# Patient Record
Sex: FEMALE | Race: WHITE | NOT HISPANIC OR LATINO | Employment: FULL TIME | URBAN - METROPOLITAN AREA
[De-identification: names, ages, dates, MRNs, and addresses within clinical notes are randomized per-mention and may not be internally consistent; named-entity substitution may affect disease eponyms.]

---

## 2018-08-15 ENCOUNTER — OFFICE VISIT (OUTPATIENT)
Dept: ORTHOPEDICS | Facility: CLINIC | Age: 38
End: 2018-08-15
Payer: COMMERCIAL

## 2018-08-15 VITALS — HEIGHT: 62 IN | WEIGHT: 181 LBS | BODY MASS INDEX: 33.31 KG/M2

## 2018-08-15 DIAGNOSIS — M17.11 PRIMARY OSTEOARTHRITIS OF RIGHT KNEE: Primary | ICD-10-CM

## 2018-08-15 PROCEDURE — 99214 OFFICE O/P EST MOD 30 MIN: CPT | Mod: 25,,, | Performed by: ORTHOPAEDIC SURGERY

## 2018-08-15 PROCEDURE — 3008F BODY MASS INDEX DOCD: CPT | Mod: ,,, | Performed by: ORTHOPAEDIC SURGERY

## 2018-08-15 RX ADMIN — TRIAMCINOLONE ACETONIDE 40 MG: 40 INJECTION, SUSPENSION INTRA-ARTICULAR; INTRAMUSCULAR at 06:08

## 2018-08-15 NOTE — PROGRESS NOTES
Subjective:       Chief Complaint    Chief Complaint   Patient presents with    Right Knee - Pain, Swelling    Knee Pain     Pt presents c/o pain to R knee. Reports that she has been experiencing pain x 1 year. She was previously unemployed for several years and when returning to work this year, the symptoms began. Denies injury/trauma. Pain increases w/ flexion/extension. pain radiates along anterior portion of limb. Rates pain as 9/10. Had xrays performed at [facility]. States that she has noticed fluid accumulation and now is entirely unable to bend without excruciating pain. utilizes NSAIDS w/ minimal relief. Has had no      Knee Pain     previous medical care for condition. No additional complaints at this time.       JAZMYN Presley is a 37 y.o.  female who presents With a history of progressive pain in her right knee for the past year.      Past History  Past Medical History:   Diagnosis Date    Anxiety      Past Surgical History:   Procedure Laterality Date    TONSILLECTOMY      TUBAL LIGATION       Social History     Socioeconomic History    Marital status:      Spouse name: Not on file    Number of children: Not on file    Years of education: Not on file    Highest education level: Not on file   Social Needs    Financial resource strain: Not on file    Food insecurity - worry: Not on file    Food insecurity - inability: Not on file    Transportation needs - medical: Not on file    Transportation needs - non-medical: Not on file   Occupational History    Not on file   Tobacco Use    Smoking status: Former Smoker     Types: Cigarettes     Last attempt to quit: 2016     Years since quittin.6    Smokeless tobacco: Never Used   Substance and Sexual Activity    Alcohol use: Yes     Comment: rarely    Drug use: Not on file    Sexual activity: Not on file   Other Topics Concern    Not on file   Social History Narrative    Not on file         Medications  Current Outpatient Medications    Medication Sig    alprazolam (XANAX) 1 MG tablet Take 1 mg by mouth 2 (two) times daily.    hydrocodone-acetaminophen  (LORTAB)  mg per tablet Take 1 tablet by mouth every 6 (six) hours as needed.    ibuprofen (ADVIL,MOTRIN) 200 MG tablet Take 200 mg by mouth every 6 (six) hours as needed.     No current facility-administered medications for this visit.        Allergies  Review of patient's allergies indicates:  No Known Allergies      Review of Systems     Constitutional: Negative    HENT: Negative  Eyes: Negative  Respiratory: Negative  Cardiovascular: Negative  Musculoskeletal: HPI  Skin: Negative  Neurological: Negative  Hematological: Negative  Endocrine: Negative      Physical Exam    There were no vitals filed for this visit.  Physical Examination:Well-developed, well-nourished white female, moderately overweight. Right knee examination revealed a moderately large effusion with the motion 0-105°. Tenderness. Crepitance was present. The knee was stable.     Skin-clear  General appearance -  well appearing, and in no distress  Mental status - awake  Neck - supple  Chest -  symmetric air entry  Heart - normal rate   Abdomen - soft      Assessment/Plan   Primary osteoarthritis of right knee    Under sterile conditions. An arthrocentesis was performed with 15 cc of clear yellow fluid removed. Knee was injected with 40 mg Kenalog and lidocaine.    Instructed in 3 position. Leg raises and use of heat and ice. Encouraged to lose weight.    This note was dictated using voice recognition software and may contain grammatical errors.

## 2018-09-03 PROCEDURE — 20610 DRAIN/INJ JOINT/BURSA W/O US: CPT | Mod: ,,, | Performed by: ORTHOPAEDIC SURGERY

## 2018-09-03 RX ORDER — TRIAMCINOLONE ACETONIDE 40 MG/ML
40 INJECTION, SUSPENSION INTRA-ARTICULAR; INTRAMUSCULAR
Status: DISCONTINUED | OUTPATIENT
Start: 2018-08-15 | End: 2018-08-15 | Stop reason: HOSPADM

## 2018-09-03 NOTE — PROCEDURES
Large Joint Aspiration/Injection: R knee  Date/Time: 8/15/2018 6:05 PM  Performed by: Goran Lerma Jr., MD  Authorized by: Goran Lerma Jr., MD     Consent Done?:  Yes (Verbal)  Indications:  Pain and joint swelling  Procedure site marked: Yes    Timeout: Prior to procedure the correct patient, procedure, and site was verified      Location:  Knee  Site:  R knee (joint)  Prep: Patient was prepped and draped in usual sterile fashion    Ultrasonic Guidance for needle placement: No  Needle size:  22 G  Approach:  Lateral  Medications:  40 mg triamcinolone acetonide 40 mg/mL  Aspirate amount (ml):  15  Aspirate:  Clear and yellow  Patient tolerance:  Patient tolerated the procedure well with no immediate complications

## 2018-10-16 ENCOUNTER — OFFICE VISIT (OUTPATIENT)
Dept: ORTHOPEDICS | Facility: CLINIC | Age: 38
End: 2018-10-16
Payer: COMMERCIAL

## 2018-10-16 VITALS — WEIGHT: 181 LBS | HEIGHT: 62 IN | BODY MASS INDEX: 33.31 KG/M2

## 2018-10-16 DIAGNOSIS — M17.11 PRIMARY OSTEOARTHRITIS OF RIGHT KNEE: Primary | ICD-10-CM

## 2018-10-16 PROCEDURE — 99214 OFFICE O/P EST MOD 30 MIN: CPT | Mod: 25,,, | Performed by: ORTHOPAEDIC SURGERY

## 2018-10-16 PROCEDURE — 20610 DRAIN/INJ JOINT/BURSA W/O US: CPT | Mod: RT,,, | Performed by: ORTHOPAEDIC SURGERY

## 2018-10-16 RX ORDER — CETIRIZINE HYDROCHLORIDE 10 MG/1
10 TABLET ORAL DAILY PRN
COMMUNITY
End: 2019-03-13 | Stop reason: SDUPTHER

## 2018-10-16 RX ORDER — TRIAMCINOLONE ACETONIDE 40 MG/ML
40 INJECTION, SUSPENSION INTRA-ARTICULAR; INTRAMUSCULAR
Status: DISCONTINUED | OUTPATIENT
Start: 2018-10-16 | End: 2018-10-16 | Stop reason: HOSPADM

## 2018-10-16 RX ADMIN — TRIAMCINOLONE ACETONIDE 40 MG: 40 INJECTION, SUSPENSION INTRA-ARTICULAR; INTRAMUSCULAR at 04:10

## 2018-10-16 NOTE — PROGRESS NOTES
Subjective:       Chief Complaint    Chief Complaint   Patient presents with    Follow-up     right knee pain since 2017 w/ no known injury.  Knee was aspirated on 8/15/18 which helped.  Twisted her knee approx. 1 week ago which caused pain & swelling again.  Requesting her knee be drained again if possible.       JAZMYN Presley is a 38 y.o.  female who presents with recurrent pain and swelling in the right knee. Uses ibuprofen with partial relief.      Past History  Past Medical History:   Diagnosis Date    Anxiety      Past Surgical History:   Procedure Laterality Date    TONSILLECTOMY      TUBAL LIGATION       Social History     Socioeconomic History    Marital status:      Spouse name: Not on file    Number of children: Not on file    Years of education: Not on file    Highest education level: Not on file   Social Needs    Financial resource strain: Not on file    Food insecurity - worry: Not on file    Food insecurity - inability: Not on file    Transportation needs - medical: Not on file    Transportation needs - non-medical: Not on file   Occupational History    Not on file   Tobacco Use    Smoking status: Former Smoker     Types: Cigarettes     Last attempt to quit: 2016     Years since quittin.7    Smokeless tobacco: Never Used   Substance and Sexual Activity    Alcohol use: Yes     Comment: rarely    Drug use: Not on file    Sexual activity: Not on file   Other Topics Concern    Not on file   Social History Narrative    Not on file         Medications  Current Outpatient Medications   Medication Sig    cetirizine (ZYRTEC) 10 MG tablet Take 10 mg by mouth daily as needed for Allergies.    ibuprofen (ADVIL,MOTRIN) 200 MG tablet Take 200 mg by mouth every 6 (six) hours as needed.     No current facility-administered medications for this visit.        Allergies  Review of patient's allergies indicates:  No Known Allergies      Review of Systems     Constitutional:  Negative    HENT: Negative  Eyes: Negative  Respiratory: Negative  Cardiovascular: Negative  Musculoskeletal: HPI  Skin: Negative  Neurological: Negative  Hematological: Negative  Endocrine: Negative      Physical Exam    There were no vitals filed for this visit.  Physical Examination: Right knee examination reveals moderate effusion with diffuse joint line tenderness. Patellofemoral crepitance. Varus alignment. Diffuse joint line tenderness. Knee is stable. Last is intact.     Skin-skin clear. Knee is stable. Neurovascular intact.  General appearance -  well appearing, and in no distress  Mental status - awake  Neck - supple  Chest -  symmetric air entry  Heart - normal rate   Abdomen - soft      Assessment/Plan   Primary osteoarthritis of right knee  -     Large Joint Aspiration/Injection: R knee    Weight loss and exercise is discussed again. She works 40 hours a week. Says she is too tired to exercise when she comes home. X-rays taken in August. At [facility] emergency room were reviewed. Again, nonweightbearing films showed a cartilage space is well-maintained. Degenerative changes are evident at the patellofemoral joint as well as at the joint margins with early osteophytic lipping.        This note was dictated using voice recognition software and may contain grammatical errors.

## 2018-10-16 NOTE — PROCEDURES
Large Joint Aspiration/Injection: R knee  Date/Time: 10/16/2018 4:15 PM  Performed by: Goran Lerma Jr., MD  Authorized by: Goran Lerma Jr., MD     Consent Done?:  Yes (Verbal)  Indications:  Pain and joint swelling  Procedure site marked: Yes    Timeout: Prior to procedure the correct patient, procedure, and site was verified      Location:  Knee  Site:  R knee (Joint)  Prep: Patient was prepped and draped in usual sterile fashion    Ultrasonic Guidance for needle placement: No  Needle size:  22 G and 25 G  Approach:  Lateral  Medications:  40 mg triamcinolone acetonide 40 mg/mL  Patient tolerance:  Patient tolerated the procedure well with no immediate complications

## 2019-03-13 ENCOUNTER — CLINICAL SUPPORT (OUTPATIENT)
Dept: URGENT CARE | Facility: CLINIC | Age: 39
End: 2019-03-13
Payer: COMMERCIAL

## 2019-03-13 VITALS
RESPIRATION RATE: 15 BRPM | OXYGEN SATURATION: 98 % | WEIGHT: 205 LBS | BODY MASS INDEX: 36.32 KG/M2 | HEIGHT: 63 IN | SYSTOLIC BLOOD PRESSURE: 132 MMHG | DIASTOLIC BLOOD PRESSURE: 85 MMHG | HEART RATE: 91 BPM | TEMPERATURE: 99 F

## 2019-03-13 DIAGNOSIS — Z20.828 EXPOSURE TO INFLUENZA: ICD-10-CM

## 2019-03-13 DIAGNOSIS — J02.9 PHARYNGITIS, UNSPECIFIED ETIOLOGY: Primary | ICD-10-CM

## 2019-03-13 DIAGNOSIS — J00 ACUTE NASOPHARYNGITIS: ICD-10-CM

## 2019-03-13 LAB
CTP QC/QA: YES
FLUAV AG NPH QL: NEGATIVE
FLUBV AG NPH QL: NEGATIVE

## 2019-03-13 PROCEDURE — 99204 PR OFFICE/OUTPT VISIT, NEW, LEVL IV, 45-59 MIN: ICD-10-PCS | Mod: 25,S$GLB,, | Performed by: NURSE PRACTITIONER

## 2019-03-13 PROCEDURE — 99204 OFFICE O/P NEW MOD 45 MIN: CPT | Mod: 25,S$GLB,, | Performed by: NURSE PRACTITIONER

## 2019-03-13 PROCEDURE — 96372 THER/PROPH/DIAG INJ SC/IM: CPT | Mod: S$GLB,,, | Performed by: NURSE PRACTITIONER

## 2019-03-13 PROCEDURE — 87804 POCT INFLUENZA A/B: ICD-10-PCS | Mod: 59,QW,, | Performed by: NURSE PRACTITIONER

## 2019-03-13 PROCEDURE — 96372 PR INJECTION,THERAP/PROPH/DIAG2ST, IM OR SUBCUT: ICD-10-PCS | Mod: S$GLB,,, | Performed by: NURSE PRACTITIONER

## 2019-03-13 PROCEDURE — 87804 INFLUENZA ASSAY W/OPTIC: CPT | Mod: QW,,, | Performed by: NURSE PRACTITIONER

## 2019-03-13 RX ORDER — OSELTAMIVIR PHOSPHATE 75 MG/1
75 CAPSULE ORAL 2 TIMES DAILY
Qty: 10 CAPSULE | Refills: 0 | Status: SHIPPED | OUTPATIENT
Start: 2019-03-13 | End: 2019-03-18

## 2019-03-13 RX ORDER — DEXAMETHASONE SODIUM PHOSPHATE 4 MG/ML
8 INJECTION, SOLUTION INTRA-ARTICULAR; INTRALESIONAL; INTRAMUSCULAR; INTRAVENOUS; SOFT TISSUE
Status: COMPLETED | OUTPATIENT
Start: 2019-03-13 | End: 2019-03-13

## 2019-03-13 RX ORDER — CETIRIZINE HYDROCHLORIDE 10 MG/1
10 TABLET ORAL DAILY PRN
Qty: 30 TABLET | Refills: 0 | Status: SHIPPED | OUTPATIENT
Start: 2019-03-13 | End: 2019-03-27 | Stop reason: SDUPTHER

## 2019-03-13 RX ORDER — FLUTICASONE PROPIONATE 50 MCG
2 SPRAY, SUSPENSION (ML) NASAL DAILY
Qty: 15.8 ML | Refills: 0 | Status: SHIPPED | OUTPATIENT
Start: 2019-03-13 | End: 2022-09-12

## 2019-03-13 RX ADMIN — DEXAMETHASONE SODIUM PHOSPHATE 8 MG: 4 INJECTION, SOLUTION INTRA-ARTICULAR; INTRALESIONAL; INTRAMUSCULAR; INTRAVENOUS; SOFT TISSUE at 04:03

## 2019-03-13 NOTE — PATIENT INSTRUCTIONS
Viral Upper Respiratory Illness (Adult)  You have a viral upper respiratory illness (URI), which is another term for the common cold. This illness is contagious during the first few days. It is spread through the air by coughing and sneezing. It may also be spread by direct contact (touching the sick person and then touching your own eyes, nose, or mouth). Frequent handwashing will decrease risk of spread. Most viral illnesses go away within 7 to 10 days with rest and simple home remedies. Sometimes the illness may last for several weeks. Antibiotics will not kill a virus, and they are generally not prescribed for this condition.    Home care  · If symptoms are severe, rest at home for the first 2 to 3 days. When you resume activity, don't let yourself get too tired.  · Avoid being exposed to cigarette smoke (yours or others).  · You may use acetaminophen or ibuprofen to control pain and fever, unless another medicine was prescribed. (Note: If you have chronic liver or kidney disease, have ever had a stomach ulcer or gastrointestinal bleeding, or are taking blood-thinning medicines, talk with your healthcare provider before using these medicines.) Aspirin should never be given to anyone under 18 years of age who is ill with a viral infection or fever. It may cause severe liver or brain damage.  · Your appetite may be poor, so a light diet is fine. Avoid dehydration by drinking 6 to 8 glasses of fluids per day (water, soft drinks, juices, tea, or soup). Extra fluids will help loosen secretions in the nose and lungs.  · Over-the-counter cold medicines will not shorten the length of time youre sick, but they may be helpful for the following symptoms: cough, sore throat, and nasal and sinus congestion. (Note: Do not use decongestants if you have high blood pressure.)  Follow-up care  Follow up with your healthcare provider, or as advised.  When to seek medical advice  Call your healthcare provider right away if any  of these occur:  · Cough with lots of colored sputum (mucus)  · Severe headache; face, neck, or ear pain  · Difficulty swallowing due to throat pain  · Fever of 100.4°F (38°C)  Call 911, or get immediate medical care  Call emergency services right away if any of these occur:  · Chest pain, shortness of breath, wheezing, or difficulty breathing  · Coughing up blood  · Inability to swallow due to throat pain  Date Last Reviewed: 9/13/2015  © 4396-1765 [employer]. [address] [place] [address]. All rights reserved. This information is not intended as a substitute for professional medical care. Always follow your healthcare professional's instructions.        Adult Self-Care for Colds  Colds are caused by viruses. They can't be cured with antibiotics. However, you can ease symptoms and support your body's efforts to heal itself.  No matter which symptoms you have, be sure to:  · Drink plenty of fluids (water or clear soup)  · Stop smoking and drinking alcohol  · Get plenty of rest    Understand a fever  · Take your temperature several times a day. If your fever is 100.4°F (38.0°C) for more than a day, call your healthcare provider.  · Relax, lie down. Go to bed if you want. Just get off your feet and rest. Also, drink plenty of fluids to avoid dehydration.  · Take acetaminophen or a nonsteroidal anti-inflammatory agent (NSAID), such as ibuprofen.  Treat a troubled nose kindly  · Breathe steam or heated humidified air to open blocked nasal passages.  a hot shower or use a vaporizer. Be careful not to get burned by the steam.  · Saline nasal sprays and decongestant tablets help open a stuffy nose. Antihistamines can also help, but they can cause side effects such as drowsiness and drying of the eyes, nose, and mouth.  Soothe a sore throat and cough  · Gargle every 2 hours with 1/4 teaspoon of salt dissolved in 1/2 cup of warm water. Suck on throat lozenges and cough drops to moisten your  throat.  · Cough medicines are available but it is unclear how well they actually work.  · Take acetaminophen or an NSAID, such as ibuprofen, to ease throat pain  Ease digestive problems  · Put fluids back into your body. Take frequent sips of clear liquids such as water or broth. Avoid drinks that have a lot of sugar in them, such as juices and sodas. These can make diarrhea worse. Older children and adults can drink sports drinks.  · As your appetite returns, you can resume your normal diet. Ask your healthcare provider if there are any foods you should avoid.  When to seek medical care  When you first notice symptoms, ask your healthcare provider if antiviral medicines are appropriate. Antibiotics should not be taken for colds or flu. Also, call your healthcare provider if you have any of the following symptoms or if you aren't feeling better after 7 days:  · Shortness of breath  · Pain or pressure in the chest or belly (abdomen)  · Worsening symptoms, especially after a period of improvement  · Fever of 100.4°F  (38.0°C) or higher, or fever that doesn't go down with medicine  · Sudden dizziness or confusion  · Severe or continued vomiting  · Signs of dehydration, including extreme thirst, dark urine, infrequent urination, dry mouth  · Spotted, red, or very sore throat   Date Last Reviewed: 12/1/2016  © 6098-0924 [employer]. [address] [place] [address], [place] [address]. All rights reserved. This information is not intended as a substitute for professional medical care. Always follow your healthcare professional's instructions.

## 2019-03-13 NOTE — PROGRESS NOTES
"Subjective:       Patient ID: Susana Presley is a 38 y.o. female.    Vitals:  height is 5' 3" (1.6 m) and weight is 93 kg (205 lb). Her temperature is 98.5 °F (36.9 °C). Her blood pressure is 132/85 and her pulse is 91. Her respiration is 15 and oxygen saturation is 98%.     Chief Complaint: Sinus Problem    Patient complains of body aches, sore throat, sinus congestion, pressure in ears, and sneezing that began yesterday. Patient states she was with her mom all day on Sunday "and she was coughing on everyone", states her mom was diagnosed with the flu on Monday. Denies fever, sob, chest pain, nausea, vomiting, diarrhea.       Sinus Problem   The current episode started in the past 7 days. There has been no fever. Associated symptoms include chills, congestion, coughing, ear pain, sinus pressure and sneezing. Pertinent negatives include no headaches, shortness of breath or sore throat. (Diarrhea,dizzy ) Treatments tried: afrin  The treatment provided no relief.       Constitution: Positive for chills. Negative for fatigue and fever.   HENT: Positive for ear pain, congestion, postnasal drip, sinus pain and sinus pressure. Negative for sore throat.    Neck: Negative for painful lymph nodes.   Cardiovascular: Negative for chest pain and leg swelling.   Eyes: Negative for double vision and blurred vision.   Respiratory: Positive for cough. Negative for shortness of breath.    Gastrointestinal: Negative for abdominal pain, nausea, vomiting and diarrhea.   Endocrine: negative.   Genitourinary: Negative for dysuria, frequency, urgency and history of kidney stones.   Musculoskeletal: Negative for joint pain, joint swelling, muscle cramps and muscle ache.   Skin: Negative for color change, pale, rash and bruising.   Allergic/Immunologic: Positive for sneezing. Negative for seasonal allergies.   Neurological: Negative for dizziness, history of vertigo, light-headedness, passing out and headaches.   Hematologic/Lymphatic: " Negative for swollen lymph nodes.   Psychiatric/Behavioral: Negative for nervous/anxious, sleep disturbance and depression. The patient is not nervous/anxious.        Objective:      Physical Exam   Constitutional: She is oriented to person, place, and time. Vital signs are normal. She appears well-developed and well-nourished. She is cooperative.  Non-toxic appearance. She does not have a sickly appearance. She does not appear ill. No distress.   HENT:   Head: Normocephalic and atraumatic.   Right Ear: Hearing, tympanic membrane, external ear and ear canal normal.   Left Ear: Hearing, tympanic membrane, external ear and ear canal normal.   Nose: Mucosal edema and rhinorrhea present. No nasal deformity. No epistaxis. Right sinus exhibits maxillary sinus tenderness and frontal sinus tenderness. Left sinus exhibits maxillary sinus tenderness and frontal sinus tenderness.   Mouth/Throat: Uvula is midline and mucous membranes are normal. No trismus in the jaw. Normal dentition. No uvula swelling. Posterior oropharyngeal erythema present. No oropharyngeal exudate or posterior oropharyngeal edema.   Eyes: Conjunctivae and lids are normal. Right eye exhibits no discharge. Left eye exhibits no discharge. No scleral icterus.   Neck: Trachea normal, normal range of motion, full passive range of motion without pain and phonation normal. Neck supple.   Cardiovascular: Normal rate, regular rhythm, normal heart sounds, intact distal pulses and normal pulses.   Pulmonary/Chest: Effort normal and breath sounds normal. No respiratory distress.   Abdominal: Soft. Normal appearance and bowel sounds are normal. She exhibits no distension, no pulsatile midline mass and no mass. There is no tenderness.   Musculoskeletal: Normal range of motion. She exhibits no edema or deformity.   Lymphadenopathy:     She has no cervical adenopathy.   Neurological: She is alert and oriented to person, place, and time. She exhibits normal muscle tone.  Coordination normal. GCS eye subscore is 4. GCS verbal subscore is 5. GCS motor subscore is 6.   Skin: Skin is warm, dry and intact. No rash noted. She is not diaphoretic. No pallor.   Psychiatric: She has a normal mood and affect. Her speech is normal and behavior is normal. Judgment and thought content normal. Cognition and memory are normal.   Nursing note and vitals reviewed.      Assessment:       1. Pharyngitis, unspecified etiology    2. Exposure to influenza    3. Acute nasopharyngitis        Plan:         Influenza negative.     Will treat as influenza based on history, physical exam, and prevalence of influenza within the community. Advised to increase fluids, rest and take tylenol or motrin for fever. Follow up with PCP.     After complete evaluation, including thorough history and physical exam, the patient's symptoms are most likely due to viral upper respiratory infection. There are no concerning features on physical exam to suggest bacterial otitis media/externa, sinusitis, pharyngitis, or peritonsillar abscess. Vital signs do not suggest sepsis. Lung sounds are clear and not consistent with pneumonia. There is no neck pain or limited ROM to suggest retropharyngeal abscess or meningitis. The patient will be treated with supportive care. Will provide RX for zyrtec and flonase upon D/C.    Pharyngitis, unspecified etiology  -     POCT Influenza A/B    Exposure to influenza    Acute nasopharyngitis    Other orders  -     dexamethasone injection 8 mg  -     fluticasone (FLONASE) 50 mcg/actuation nasal spray; 2 sprays (100 mcg total) by Each Nare route once daily.  Dispense: 15.8 mL; Refill: 0  -     dexchlorphen-phenylephrine-DM (POLYTUSSIN DM) 1-5-10 mg/5 mL Syrp; Take 5 mLs by mouth every 4 (four) hours as needed.  Dispense: 120 mL; Refill: 0  -     oseltamivir (TAMIFLU) 75 MG capsule; Take 1 capsule (75 mg total) by mouth 2 (two) times daily. for 5 days  Dispense: 10 capsule; Refill: 0  -      cetirizine (ZYRTEC) 10 MG tablet; Take 1 tablet (10 mg total) by mouth daily as needed for Allergies.  Dispense: 30 tablet; Refill: 0

## 2019-03-27 ENCOUNTER — CLINICAL SUPPORT (OUTPATIENT)
Dept: URGENT CARE | Facility: CLINIC | Age: 39
End: 2019-03-27
Payer: COMMERCIAL

## 2019-03-27 VITALS
OXYGEN SATURATION: 97 % | HEIGHT: 64 IN | HEART RATE: 91 BPM | BODY MASS INDEX: 35.37 KG/M2 | DIASTOLIC BLOOD PRESSURE: 88 MMHG | SYSTOLIC BLOOD PRESSURE: 127 MMHG | TEMPERATURE: 99 F | WEIGHT: 207.19 LBS | RESPIRATION RATE: 14 BRPM

## 2019-03-27 DIAGNOSIS — J01.90 ACUTE NON-RECURRENT SINUSITIS, UNSPECIFIED LOCATION: Primary | ICD-10-CM

## 2019-03-27 PROCEDURE — 99214 OFFICE O/P EST MOD 30 MIN: CPT | Mod: 25,S$GLB,, | Performed by: NURSE PRACTITIONER

## 2019-03-27 PROCEDURE — 99214 PR OFFICE/OUTPT VISIT, EST, LEVL IV, 30-39 MIN: ICD-10-PCS | Mod: 25,S$GLB,, | Performed by: NURSE PRACTITIONER

## 2019-03-27 PROCEDURE — 96372 PR INJECTION,THERAP/PROPH/DIAG2ST, IM OR SUBCUT: ICD-10-PCS | Mod: S$GLB,,, | Performed by: NURSE PRACTITIONER

## 2019-03-27 PROCEDURE — 96372 THER/PROPH/DIAG INJ SC/IM: CPT | Mod: S$GLB,,, | Performed by: NURSE PRACTITIONER

## 2019-03-27 RX ORDER — BENZONATATE 100 MG/1
100 CAPSULE ORAL 3 TIMES DAILY PRN
Qty: 30 CAPSULE | Refills: 1 | Status: SHIPPED | OUTPATIENT
Start: 2019-03-27 | End: 2019-03-27 | Stop reason: SDUPTHER

## 2019-03-27 RX ORDER — FLUTICASONE PROPIONATE 50 MCG
2 SPRAY, SUSPENSION (ML) NASAL 2 TIMES DAILY
Qty: 1 BOTTLE | Refills: 0 | Status: SHIPPED | OUTPATIENT
Start: 2019-03-27 | End: 2022-09-12

## 2019-03-27 RX ORDER — CETIRIZINE HYDROCHLORIDE 10 MG/1
10 TABLET ORAL DAILY PRN
Qty: 30 TABLET | Refills: 0 | Status: SHIPPED | OUTPATIENT
Start: 2019-03-27 | End: 2022-09-12

## 2019-03-27 RX ORDER — BROMPHENIRAMINE MALEATE, PSEUDOEPHEDRINE HYDROCHLORIDE, AND DEXTROMETHORPHAN HYDROBROMIDE 2; 30; 10 MG/5ML; MG/5ML; MG/5ML
5 SYRUP ORAL EVERY 4 HOURS PRN
Qty: 240 ML | Refills: 0 | Status: SHIPPED | OUTPATIENT
Start: 2019-03-27 | End: 2019-04-06

## 2019-03-27 RX ORDER — PREDNISONE 20 MG/1
20 TABLET ORAL 2 TIMES DAILY
Qty: 10 TABLET | Refills: 0 | Status: SHIPPED | OUTPATIENT
Start: 2019-03-27 | End: 2019-04-01

## 2019-03-27 RX ORDER — BENZONATATE 100 MG/1
100 CAPSULE ORAL 3 TIMES DAILY PRN
Qty: 30 CAPSULE | Refills: 1 | Status: SHIPPED | OUTPATIENT
Start: 2019-03-27 | End: 2020-03-26

## 2019-03-27 RX ORDER — PREDNISONE 20 MG/1
20 TABLET ORAL 2 TIMES DAILY
Qty: 10 TABLET | Refills: 0 | Status: SHIPPED | OUTPATIENT
Start: 2019-03-27 | End: 2019-03-27 | Stop reason: SDUPTHER

## 2019-03-27 RX ORDER — FLUTICASONE PROPIONATE 50 MCG
2 SPRAY, SUSPENSION (ML) NASAL 2 TIMES DAILY
Qty: 1 BOTTLE | Refills: 0 | Status: SHIPPED | OUTPATIENT
Start: 2019-03-27 | End: 2019-03-27 | Stop reason: SDUPTHER

## 2019-03-27 RX ORDER — DEXAMETHASONE SODIUM PHOSPHATE 4 MG/ML
8 INJECTION, SOLUTION INTRA-ARTICULAR; INTRALESIONAL; INTRAMUSCULAR; INTRAVENOUS; SOFT TISSUE
Status: COMPLETED | OUTPATIENT
Start: 2019-03-27 | End: 2019-03-27

## 2019-03-27 RX ADMIN — DEXAMETHASONE SODIUM PHOSPHATE 8 MG: 4 INJECTION, SOLUTION INTRA-ARTICULAR; INTRALESIONAL; INTRAMUSCULAR; INTRAVENOUS; SOFT TISSUE at 02:03

## 2019-03-27 NOTE — PROGRESS NOTES
"Subjective:       Patient ID: Susana Presley is a 38 y.o. female.    Vitals:  height is 5' 4" (1.626 m) and weight is 94 kg (207 lb 3.2 oz). Her oral temperature is 98.5 °F (36.9 °C). Her blood pressure is 127/88 and her pulse is 91. Her respiration is 14 and oxygen saturation is 97%.     Chief Complaint: Sinus Problem    Sinus Problem   This is a new problem. The current episode started yesterday. The problem has been gradually worsening since onset. There has been no fever. Associated symptoms include congestion, coughing, headaches, a hoarse voice, sinus pressure, sneezing and a sore throat. Pertinent negatives include no chills or shortness of breath. Treatments tried: BC sinus med. The treatment provided mild relief.       Constitution: Positive for fatigue. Negative for chills and fever.   HENT: Positive for congestion, postnasal drip, sinus pain, sinus pressure and sore throat.    Neck: Negative for painful lymph nodes.   Cardiovascular: Negative for chest pain and leg swelling.   Eyes: Negative for double vision and blurred vision.   Respiratory: Positive for cough. Negative for shortness of breath.    Gastrointestinal: Negative for nausea, vomiting and diarrhea.   Genitourinary: Negative for dysuria, frequency, urgency and history of kidney stones.   Musculoskeletal: Negative for joint pain, joint swelling, muscle cramps and muscle ache.   Skin: Negative for color change, pale, rash and bruising.   Allergic/Immunologic: Positive for sneezing. Negative for seasonal allergies.   Neurological: Positive for headaches. Negative for dizziness, history of vertigo, light-headedness and passing out.   Hematologic/Lymphatic: Negative for swollen lymph nodes.   Psychiatric/Behavioral: Negative for nervous/anxious, sleep disturbance and depression. The patient is not nervous/anxious.        Objective:      Physical Exam   Constitutional: She is oriented to person, place, and time. Vital signs are normal. She appears " well-developed and well-nourished. She is cooperative.   HENT:   Head: Normocephalic.   Right Ear: Hearing, external ear and ear canal normal. A middle ear effusion is present.   Left Ear: Hearing, external ear and ear canal normal. A middle ear effusion is present.   Nose: Mucosal edema and rhinorrhea present. Right sinus exhibits maxillary sinus tenderness. Left sinus exhibits maxillary sinus tenderness.   Mouth/Throat: Uvula is midline and mucous membranes are normal. Posterior oropharyngeal erythema present.   Eyes: Pupils are equal, round, and reactive to light. Conjunctivae, EOM and lids are normal.   Neck: Trachea normal, normal range of motion, full passive range of motion without pain and phonation normal. Neck supple.   Cardiovascular: Normal rate, regular rhythm, normal heart sounds, intact distal pulses and normal pulses.   Pulmonary/Chest: Effort normal and breath sounds normal.   Abdominal: Soft. Normal appearance, normal aorta and bowel sounds are normal. There is no tenderness.   Musculoskeletal: Normal range of motion.   Neurological: She is alert and oriented to person, place, and time. She has normal strength. GCS eye subscore is 4. GCS verbal subscore is 5. GCS motor subscore is 6.   Skin: Skin is warm, dry and intact. Capillary refill takes less than 2 seconds.   Psychiatric: She has a normal mood and affect. Her speech is normal and behavior is normal. Judgment and thought content normal. Cognition and memory are normal.       Assessment:       1. Acute non-recurrent sinusitis, unspecified location        Plan:         Acute non-recurrent sinusitis, unspecified location    Other orders  -     dexamethasone injection 8 mg  -     brompheniramine-pseudoeph-DM (BROMFED DM) 2-30-10 mg/5 mL Syrp; Take 5 mLs by mouth every 4 (four) hours as needed.  Dispense: 240 mL; Refill: 0  -     fluticasone (FLONASE) 50 mcg/actuation nasal spray; 2 sprays (100 mcg total) by Each Nare route 2 (two) times daily.   Dispense: 1 Bottle; Refill: 0  -     predniSONE (DELTASONE) 20 MG tablet; Take 1 tablet (20 mg total) by mouth 2 (two) times daily. for 5 days  Dispense: 10 tablet; Refill: 0  -     benzonatate (TESSALON PERLES) 100 MG capsule; Take 1 capsule (100 mg total) by mouth 3 (three) times daily as needed for Cough.  Dispense: 30 capsule; Refill: 1

## 2019-03-27 NOTE — PATIENT INSTRUCTIONS
Symptomatic treatment:    Alternate Tylenol and Ibuprofen every 3 hrs for fever, pain and inflammation. Avoid Nsaids if you are pregnant or have advanced kidney disease.     salt water gargles to soothe throat from post nasal drip  Honey/lemon water or warm tea to soothe throat from post nasal drip  Cepachol helps to soothe the discomfort in throat from post nasal drip    Cold-eeze helps to reduce the duration of URI symptoms if taken early  Elderberry to reduce duration of viral URI symptoms    Nasal saline spray reduces inflammation and dryness  Warm face compresses/hot showers as often as you can to open sinuses and allow to drain.   Flonase OTC or Nasacort OTC to help decrease inflammation in nasal turbinates and allow sinuses to drain    Vicks vapor rub at night  Simple foods like chicken noodle soup help provide hydration and nutrition    Delsym helps with coughing at night    Zantac will help if there is reflux from the post nasal drip and helpful to take at night    Zyrtec/Claritin during the day and Benadryl at night may help if allergy component concurrently with URI    Rest as much as you can    Your symptoms will likely last 5-7 days, maybe longer depending on how it affects your body.  You are contagious 5-7, so minimize contact with others to reduce the spread to others and stay home from work or school as we discussed. Dehydration is preventable but is one of the main reasons why you will feel so badly. Drink pedialyte, gatorade or propel. Stay hydrated.  Antibiotics are not needed unless a complication(such as Otitis Media, Bacterial sinus infection or pneumonia) develops. Taking antibiotics for Flu/Cold is not supported by evidence-based medicine and can expose you to unnecessary side effects of the medication, such as anaphylaxis, yeast infection and leads to antibiotic resistance.   If you experience any:  Chest pain, shortness of breath, wheezing or difficulty breathing,  Severe headache, face,  neck or ear pain,  New rash,  Fever over 101.5º F (38.6 C) for more than three days,  Confusion, behavior change or seizure,  Severe weakness or dizziness, please go to the ER immediately for further testing.             Acute Sinusitis    Acute sinusitis is irritation and swelling of the sinuses. It is usually caused by a viral infection after a common cold. Your doctor can help you find relief.  What is acute sinusitis?  Sinuses are air-filled spaces in the skull behind the face. They are kept moist and clean by a lining of mucosa. Things such as pollen, smoke, and chemical fumes can irritate the mucosa. It can then swell up. As a response to irritation, the mucosa makes more mucus and other fluids. Tiny hairlike cilia cover the mucosa. Cilia help carry mucus toward the opening of the sinus. Too much mucus may cause the cilia to stop working. This blocks the sinus opening. A buildup of fluid in the sinuses then causes pain and pressure. It can also encourage bacteria to grow in the sinuses.  Common symptoms of acute sinusitis  You may have:  · Facial soreness pain  · Headache  · Fever  · Fluid draining in the back of the throat (postnasal drip)  · Congestion  · Drainage that is thick and colored, instead of clear  · Cough  Diagnosing acute sinusitis  Your doctor will ask about your symptoms and health history. He or she will look at your ear, nose, and throat. You usually won't need to have X-rays taken.    The doctor may take a sample of mucus to check for bacteria. If you have sinusitis that keeps coming back, you may need imaging tests such as X-rays or CAT scans. This will help your doctor check for a structural problem that may be causing the infection.  Treating acute sinusitis  Treatment is aimed at unblocking the sinus opening and helping the cilia work again. You may need to take antihistamine and decongestant medicine. These can reduce inflammation and decrease the amount of fluid your sinuses make. If  you have a bacterial infection, you will need to take antibiotic medicine for 10 to 14 days. Take this medicine until it is gone, even if you feel better.  Date Last Reviewed: 10/1/2016  © 3492-6285 [employer]. [address] [place] [address], [place] [address]. All rights reserved. This information is not intended as a substitute for professional medical care. Always follow your healthcare professional's instructions.        Preventing Sinusitis    Colds, flu, and allergies make it more likely for you to get sinusitis. Do your best to prevent sinusitis by preventing these problems. Do what you can to avoid getting colds and other infections. Stay away from things that cause allergies (allergens). Keep your sinuses as moist as you can.  Tips for air travel  When traveling on an airplane, use saline nasal spray to keep your sinuses moist. Drink plenty of fluids. You may also want to take a decongestant before you get on the plane.   Prevent colds  Do what you can to avoid being exposed to colds and flu. When possible, take more time to rest when you feel something coming on.  · Wash your hands often. This is especially important during cold and flu season. Try not to touch your face.  · As much as possible, stay away from infected people.  · Follow these standbys for staying healthy: Eat balanced meals, exercise regularly, and get plenty of sleep.  Stay away from allergens  First find out what things youre allergic to. Then take steps to stay away from allergens or irritants in the air such as dust, pollution, and pollen.  · Wear a mask when you clean. Or consider hiring a  to help you stay away from dust.  · Sit in the nonsmoking sections of restaurants.  · Don't go outdoors during peak pollution hours such as rush hour.  · Keep an air conditioner on during allergy season. Clean its filter regularly.  · Ask your healthcare provider about a referral to have an allergy evaluation. Or ask for a  referral to see an allergy specialist.  Boost moisture  Keeping your sinuses moist makes your mucus thinner. This allows your sinuses to drain better. And this helps prevent infection. Ask your doctor about these suggestions:  · Use a humidifier. Clean it often to remove any mold or mildew.  · Drink several glasses of water a day.  · Stay away from drying beverages such as alcohol and coffee.  · Stay away from all types of smoke, which dries out sinus linings. This includes tobacco smoke and chemical smoke in workplace settings.  · Use saltwater rinses.  Date Last Reviewed: 10/1/2016  © 4345-6193 [employer]. [address] [place] [address], [place] [address]. All rights reserved. This information is not intended as a substitute for professional medical care. Always follow your healthcare professional's instructions.        Self-Care for Sinusitis     Drinking plenty of water can help sinuses drain.   Sinusitis can often be managed with self-care. Self-care can keep sinuses moist and make you feel more comfortable. Remember to follow your doctor's instructions closely. This can make a big difference in getting your sinus problem under control.  Drink fluids  Drinking extra fluids helps thin your mucus. This lets it drain from your sinuses more easily. Have a glass of water every hour or two. A humidifier helps in much the same way. Fluids can also offset the drying effects of certain medicines. If you use a humidifier, follow the product maker's instructions on how to use it. Clean it on a regular schedule.  Use saltwater rinses  Rinses help keep your sinuses and nose moist. Mix a teaspoon of salt in 8 ounces of fresh, warm water. Use a bulb syringe to gently squirt the water into your nose a few times a day. You can also buy ready-made saline nasal sprays.  Apply hot or cold packs  Applying heat to the area surrounding your sinuses may make you feel more comfortable. Use a hot water bottle or a hand towel  dipped in hot water. Some people also find ice packs effective for relieving pain.  Medicines  Your doctor may prescribe medications to help treat your sinusitis. If you have an infection, antibiotics can help clear it up. If you are prescribed antibiotics, take all pills on schedule until they are gone, even if you feel better. Decongestants help relieve swelling. Use decongestant sprays for short periods only under the direction of your doctor. If you have allergies, your doctor may prescribe medications to help relieve them.   Date Last Reviewed: 10/1/2016  © 9589-2335 [employer]. [address] [place] [address]. All rights reserved. This information is not intended as a substitute for professional medical care. Always follow your healthcare professional's instructions.

## 2019-03-27 NOTE — LETTER
March 27, 2019      [facility]  [address]  [facility] [address]  Phone: [phone]       Patient: Susana Presley   YOB: 1980  Date of Visit: 03/27/2019    To Whom It May Concern:    Becky Presley  was at [facility] on 03/27/2019. She may return to work/school on 3/29/19 with no restrictions. If you have any questions or concerns, or if I can be of further assistance, please do not hesitate to contact me.    Sincerely,    EDILBERTO Louis

## 2021-02-12 ENCOUNTER — OFFICE VISIT (OUTPATIENT)
Dept: URGENT CARE | Facility: CLINIC | Age: 41
End: 2021-02-12
Payer: COMMERCIAL

## 2021-02-12 VITALS
DIASTOLIC BLOOD PRESSURE: 96 MMHG | SYSTOLIC BLOOD PRESSURE: 141 MMHG | HEART RATE: 96 BPM | TEMPERATURE: 99 F | OXYGEN SATURATION: 98 %

## 2021-02-12 DIAGNOSIS — U07.1 COVID-19 VIRUS DETECTED: ICD-10-CM

## 2021-02-12 DIAGNOSIS — R11.2 NAUSEA AND VOMITING, INTRACTABILITY OF VOMITING NOT SPECIFIED, UNSPECIFIED VOMITING TYPE: ICD-10-CM

## 2021-02-12 DIAGNOSIS — R05.9 COUGH: Primary | ICD-10-CM

## 2021-02-12 DIAGNOSIS — R43.0 ANOSMIA: ICD-10-CM

## 2021-02-12 DIAGNOSIS — R43.2 LOSS OF TASTE: ICD-10-CM

## 2021-02-12 LAB
CTP QC/QA: YES
SARS-COV-2 RDRP RESP QL NAA+PROBE: POSITIVE

## 2021-02-12 PROCEDURE — 99213 OFFICE O/P EST LOW 20 MIN: CPT | Mod: S$GLB,,, | Performed by: EMERGENCY MEDICINE

## 2021-02-12 PROCEDURE — 99213 PR OFFICE/OUTPT VISIT, EST, LEVL III, 20-29 MIN: ICD-10-PCS | Mod: S$GLB,,, | Performed by: EMERGENCY MEDICINE

## 2021-02-12 PROCEDURE — U0002 COVID-19 LAB TEST NON-CDC: HCPCS | Mod: QW,S$GLB,, | Performed by: EMERGENCY MEDICINE

## 2021-02-12 PROCEDURE — U0002: ICD-10-PCS | Mod: QW,S$GLB,, | Performed by: EMERGENCY MEDICINE

## 2021-02-12 RX ORDER — BROMPHENIRAMINE MALEATE, PSEUDOEPHEDRINE HYDROCHLORIDE, AND DEXTROMETHORPHAN HYDROBROMIDE 2; 30; 10 MG/5ML; MG/5ML; MG/5ML
5 SYRUP ORAL EVERY 4 HOURS PRN
Qty: 240 ML | Refills: 0 | Status: SHIPPED | OUTPATIENT
Start: 2021-02-12 | End: 2021-02-22

## 2021-02-12 RX ORDER — ONDANSETRON 4 MG/1
4 TABLET, ORALLY DISINTEGRATING ORAL EVERY 6 HOURS PRN
Qty: 30 TABLET | Refills: 0 | Status: SHIPPED | OUTPATIENT
Start: 2021-02-12 | End: 2022-09-12

## 2021-02-12 RX ORDER — BROMPHENIRAMINE MALEATE, PSEUDOEPHEDRINE HYDROCHLORIDE, AND DEXTROMETHORPHAN HYDROBROMIDE 2; 30; 10 MG/5ML; MG/5ML; MG/5ML
5 SYRUP ORAL EVERY 4 HOURS PRN
Qty: 240 ML | Refills: 0 | Status: SHIPPED | OUTPATIENT
Start: 2021-02-12 | End: 2021-02-12 | Stop reason: SDUPTHER

## 2021-02-12 RX ORDER — ALBUTEROL SULFATE 0.83 MG/ML
2.5 SOLUTION RESPIRATORY (INHALATION) EVERY 6 HOURS PRN
Qty: 1 BOX | Refills: 0 | Status: SHIPPED | OUTPATIENT
Start: 2021-02-12 | End: 2022-09-12

## 2021-04-07 ENCOUNTER — IMMUNIZATION (OUTPATIENT)
Dept: PRIMARY CARE CLINIC | Facility: CLINIC | Age: 41
End: 2021-04-07
Payer: COMMERCIAL

## 2021-04-07 DIAGNOSIS — Z23 NEED FOR VACCINATION: Primary | ICD-10-CM

## 2021-04-07 PROCEDURE — 91303 COVID-19,VECTOR-NR,RS-AD26,PF,0.5 ML DOSE VACCINE (JANSSEN): CPT | Mod: S$GLB,,, | Performed by: FAMILY MEDICINE

## 2021-04-07 PROCEDURE — 0031A COVID-19,VECTOR-NR,RS-AD26,PF,0.5 ML DOSE VACCINE (JANSSEN): CPT | Mod: CV19,S$GLB,, | Performed by: FAMILY MEDICINE

## 2021-04-07 PROCEDURE — 0031A COVID-19,VECTOR-NR,RS-AD26,PF,0.5 ML DOSE VACCINE (JANSSEN): ICD-10-PCS | Mod: CV19,S$GLB,, | Performed by: FAMILY MEDICINE

## 2021-04-07 PROCEDURE — 91303 COVID-19,VECTOR-NR,RS-AD26,PF,0.5 ML DOSE VACCINE (JANSSEN): ICD-10-PCS | Mod: S$GLB,,, | Performed by: FAMILY MEDICINE

## 2022-01-20 ENCOUNTER — HOSPITAL ENCOUNTER (EMERGENCY)
Facility: HOSPITAL | Age: 42
Discharge: PSYCHIATRIC OR SUBSTANCE USE FACILITY | End: 2022-01-20
Attending: EMERGENCY MEDICINE
Payer: COMMERCIAL

## 2022-01-20 VITALS
HEART RATE: 98 BPM | DIASTOLIC BLOOD PRESSURE: 80 MMHG | WEIGHT: 179 LBS | HEIGHT: 62 IN | OXYGEN SATURATION: 100 % | BODY MASS INDEX: 32.94 KG/M2 | TEMPERATURE: 99 F | SYSTOLIC BLOOD PRESSURE: 128 MMHG | RESPIRATION RATE: 20 BRPM

## 2022-01-20 DIAGNOSIS — Z00.8 MEDICAL CLEARANCE FOR PSYCHIATRIC ADMISSION: Primary | ICD-10-CM

## 2022-01-20 DIAGNOSIS — R45.851 SUICIDAL IDEATION: ICD-10-CM

## 2022-01-20 LAB
ALBUMIN SERPL BCP-MCNC: 4.9 G/DL (ref 3.5–5.2)
ALP SERPL-CCNC: 60 U/L (ref 55–135)
ALT SERPL W/O P-5'-P-CCNC: 18 U/L (ref 10–44)
AMPHET+METHAMPHET UR QL: NEGATIVE
ANION GAP SERPL CALC-SCNC: 8 MMOL/L (ref 8–16)
APAP SERPL-MCNC: <10 UG/ML (ref 10–20)
AST SERPL-CCNC: 14 U/L (ref 10–40)
B-HCG UR QL: NEGATIVE
BARBITURATES UR QL SCN>200 NG/ML: NEGATIVE
BASOPHILS # BLD AUTO: 0.03 K/UL (ref 0–0.2)
BASOPHILS NFR BLD: 0.2 % (ref 0–1.9)
BENZODIAZ UR QL SCN>200 NG/ML: ABNORMAL
BILIRUB SERPL-MCNC: 1.3 MG/DL (ref 0.1–1)
BILIRUB UR QL STRIP: NEGATIVE
BUN SERPL-MCNC: 17 MG/DL (ref 6–20)
BZE UR QL SCN: NEGATIVE
CALCIUM SERPL-MCNC: 9.9 MG/DL (ref 8.7–10.5)
CANNABINOIDS UR QL SCN: ABNORMAL
CHLORIDE SERPL-SCNC: 106 MMOL/L (ref 95–110)
CLARITY UR: CLEAR
CO2 SERPL-SCNC: 25 MMOL/L (ref 23–29)
COLOR UR: YELLOW
CREAT SERPL-MCNC: 1 MG/DL (ref 0.5–1.4)
CREAT UR-MCNC: 163 MG/DL (ref 15–325)
CTP QC/QA: YES
DIFFERENTIAL METHOD: ABNORMAL
EOSINOPHIL # BLD AUTO: 0.3 K/UL (ref 0–0.5)
EOSINOPHIL NFR BLD: 2.3 % (ref 0–8)
ERYTHROCYTE [DISTWIDTH] IN BLOOD BY AUTOMATED COUNT: 12.7 % (ref 11.5–14.5)
EST. GFR  (AFRICAN AMERICAN): >60 ML/MIN/1.73 M^2
EST. GFR  (NON AFRICAN AMERICAN): >60 ML/MIN/1.73 M^2
ETHANOL SERPL-MCNC: <5 MG/DL
GLUCOSE SERPL-MCNC: 119 MG/DL (ref 70–110)
GLUCOSE UR QL STRIP: NEGATIVE
HCT VFR BLD AUTO: 46.2 % (ref 37–48.5)
HGB BLD-MCNC: 15.2 G/DL (ref 12–16)
HGB UR QL STRIP: NEGATIVE
IMM GRANULOCYTES # BLD AUTO: 0.11 K/UL (ref 0–0.04)
IMM GRANULOCYTES NFR BLD AUTO: 0.8 % (ref 0–0.5)
KETONES UR QL STRIP: NEGATIVE
LEUKOCYTE ESTERASE UR QL STRIP: NEGATIVE
LYMPHOCYTES # BLD AUTO: 3 K/UL (ref 1–4.8)
LYMPHOCYTES NFR BLD: 21.9 % (ref 18–48)
MCH RBC QN AUTO: 29.6 PG (ref 27–31)
MCHC RBC AUTO-ENTMCNC: 32.9 G/DL (ref 32–36)
MCV RBC AUTO: 90 FL (ref 82–98)
MONOCYTES # BLD AUTO: 0.7 K/UL (ref 0.3–1)
MONOCYTES NFR BLD: 5 % (ref 4–15)
NEUTROPHILS # BLD AUTO: 9.4 K/UL (ref 1.8–7.7)
NEUTROPHILS NFR BLD: 69.8 % (ref 38–73)
NITRITE UR QL STRIP: NEGATIVE
NRBC BLD-RTO: 0 /100 WBC
OPIATES UR QL SCN: NEGATIVE
PCP UR QL SCN>25 NG/ML: NEGATIVE
PH UR STRIP: 6 [PH] (ref 5–8)
PLATELET # BLD AUTO: 301 K/UL (ref 150–450)
PMV BLD AUTO: 9.9 FL (ref 9.2–12.9)
POTASSIUM SERPL-SCNC: 4.2 MMOL/L (ref 3.5–5.1)
PROT SERPL-MCNC: 8.7 G/DL (ref 6–8.4)
PROT UR QL STRIP: NEGATIVE
RBC # BLD AUTO: 5.13 M/UL (ref 4–5.4)
SALICYLATES SERPL-MCNC: <4 MG/DL (ref 15–30)
SODIUM SERPL-SCNC: 139 MMOL/L (ref 136–145)
SP GR UR STRIP: 1.02 (ref 1–1.03)
TOXICOLOGY INFORMATION: ABNORMAL
TSH SERPL DL<=0.005 MIU/L-ACNC: 0.98 UIU/ML (ref 0.34–5.6)
URN SPEC COLLECT METH UR: NORMAL
UROBILINOGEN UR STRIP-ACNC: NEGATIVE EU/DL
WBC # BLD AUTO: 13.45 K/UL (ref 3.9–12.7)

## 2022-01-20 PROCEDURE — 81025 URINE PREGNANCY TEST: CPT | Performed by: EMERGENCY MEDICINE

## 2022-01-20 PROCEDURE — 80179 DRUG ASSAY SALICYLATE: CPT | Performed by: EMERGENCY MEDICINE

## 2022-01-20 PROCEDURE — 99285 EMERGENCY DEPT VISIT HI MDM: CPT

## 2022-01-20 PROCEDURE — 81003 URINALYSIS AUTO W/O SCOPE: CPT | Performed by: EMERGENCY MEDICINE

## 2022-01-20 PROCEDURE — 80053 COMPREHEN METABOLIC PANEL: CPT | Performed by: EMERGENCY MEDICINE

## 2022-01-20 PROCEDURE — 80307 DRUG TEST PRSMV CHEM ANLYZR: CPT | Performed by: EMERGENCY MEDICINE

## 2022-01-20 PROCEDURE — 85025 COMPLETE CBC W/AUTO DIFF WBC: CPT | Performed by: EMERGENCY MEDICINE

## 2022-01-20 PROCEDURE — 80143 DRUG ASSAY ACETAMINOPHEN: CPT | Performed by: EMERGENCY MEDICINE

## 2022-01-20 PROCEDURE — 84443 ASSAY THYROID STIM HORMONE: CPT | Performed by: EMERGENCY MEDICINE

## 2022-01-20 PROCEDURE — 82077 ASSAY SPEC XCP UR&BREATH IA: CPT | Performed by: EMERGENCY MEDICINE

## 2022-01-20 RX ORDER — ESOMEPRAZOLE MAGNESIUM 40 MG/1
CAPSULE, DELAYED RELEASE ORAL
COMMUNITY
Start: 2022-01-03 | End: 2022-09-12

## 2022-01-20 RX ORDER — MEDROXYPROGESTERONE ACETATE 150 MG/ML
150 INJECTION, SUSPENSION INTRAMUSCULAR
COMMUNITY
Start: 2021-10-28 | End: 2024-01-28

## 2022-01-20 RX ORDER — NAPROXEN 500 MG/1
1 TABLET ORAL 2 TIMES DAILY WITH MEALS
COMMUNITY
Start: 2021-12-30

## 2022-01-20 RX ORDER — NAPROXEN SODIUM 550 MG/1
550 TABLET ORAL
COMMUNITY
Start: 2021-08-24 | End: 2022-02-20

## 2022-01-20 RX ORDER — GUAIFENESIN, PSEUDOEPHEDRINE HYDROCHLORIDE 600; 60 MG/1; MG/1
1 TABLET, EXTENDED RELEASE ORAL
COMMUNITY
End: 2024-01-28 | Stop reason: ALTCHOICE

## 2022-01-20 RX ORDER — GEMFIBROZIL 600 MG/1
600 TABLET, FILM COATED ORAL
COMMUNITY
Start: 2021-09-09 | End: 2022-10-25

## 2022-01-20 RX ORDER — ALPRAZOLAM 0.5 MG/1
0.5 TABLET ORAL DAILY
COMMUNITY
Start: 2022-01-17 | End: 2022-10-17 | Stop reason: ALTCHOICE

## 2022-01-20 RX ORDER — PANTOPRAZOLE SODIUM 40 MG/1
1 TABLET, DELAYED RELEASE ORAL DAILY
COMMUNITY

## 2022-01-20 NOTE — ED NOTES
Pt wheeled to [facility] transport with tech, security and two [facility] staff.

## 2022-01-20 NOTE — PLAN OF CARE
Received call voice message from Christiano with [facility] to call him back and called him back at [phone].  Christiano informed me that Emmy Hu NP, willing to accept this patient to [facility], [address], [place] [address].  Christiano requested for report to be called to [phone] and reported that patient is assigned to [facility] 3 unit.    Next, called [facility] at [phone] and spoke with Corie to request secure transport of this patient with [facility] to [facility], [address], [place] [address], using trip #[id number].

## 2022-01-20 NOTE — ED PROVIDER NOTES
Encounter Date: 2022       History     Chief Complaint   Patient presents with    Suicidal     Patient reports having marital problems with  and having suicidal thoughts for a few months w/o plan      The history is provided by the patient.   Mental Health Problem  The primary symptoms include depressed mood, dysphoric mood and suicidal ideas. The primary symptoms do not include hallucinations. The current episode started several weeks ago. This is a new problem.   The onset of the illness is precipitated by stressful event and emotional stress. The degree of incapacity that she is experiencing as a consequence of her illness is severe. Sequelae of the illness include an inability to work, an inability to care for self and harmed interpersonal relations. Additional symptoms of the illness include anhedonia, insomnia, appetite change, fatigue, psychomotor retardation, feelings of worthlessness and attention impairment. Additional symptoms of the illness do not include euphoric mood, increased goal-directed activity, flight of ideas, inflated self-esteem, visual change, headaches, abdominal pain or seizures. She admits to suicidal ideas. She does not have a plan to attempt suicide. She contemplates harming herself. She has not already injured self. She does not contemplate injuring another person. She has not already  injured another person.     Review of patient's allergies indicates:  No Known Allergies  Past Medical History:   Diagnosis Date    Anxiety      Past Surgical History:   Procedure Laterality Date    TONSILLECTOMY      TUBAL LIGATION       Family History   Problem Relation Age of Onset    Diabetes Mother     Hypertension Mother     Hyperlipidemia Father      Social History     Tobacco Use    Smoking status: Former Smoker     Types: Cigarettes     Quit date:      Years since quittin.0    Smokeless tobacco: Never Used   Substance Use Topics    Alcohol use: Yes     Comment:  rarely     Review of Systems   Constitutional: Positive for appetite change and fatigue. Negative for activity change, chills and fever.   HENT: Negative.  Negative for congestion, dental problem, ear pain, rhinorrhea, sinus pressure, sinus pain, sore throat and trouble swallowing.    Eyes: Negative.  Negative for photophobia, pain, redness and visual disturbance.   Respiratory: Negative.  Negative for cough, chest tightness, shortness of breath and wheezing.    Cardiovascular: Negative.  Negative for chest pain, palpitations and leg swelling.   Gastrointestinal: Negative.  Negative for abdominal distention, abdominal pain, anal bleeding, blood in stool, constipation, diarrhea, nausea and vomiting.   Endocrine: Negative.    Genitourinary: Negative.  Negative for decreased urine volume, difficulty urinating, dysuria, flank pain, frequency, hematuria, pelvic pain and urgency.   Musculoskeletal: Negative.  Negative for arthralgias, back pain, gait problem, joint swelling, myalgias, neck pain and neck stiffness.   Skin: Negative.  Negative for color change, pallor and rash.   Neurological: Negative.  Negative for dizziness, tremors, seizures, syncope, facial asymmetry, speech difficulty, weakness, light-headedness, numbness and headaches.   Hematological: Negative.  Does not bruise/bleed easily.   Psychiatric/Behavioral: Positive for dysphoric mood, sleep disturbance and suicidal ideas. Negative for confusion and hallucinations. The patient is nervous/anxious and has insomnia.    All other systems reviewed and are negative.      Physical Exam     Initial Vitals [01/20/22 0937]   BP Pulse Resp Temp SpO2   133/87 105 20 98 °F (36.7 °C) 99 %      MAP       --         Physical Exam    Nursing note and vitals reviewed.  Constitutional: She appears well-developed and well-nourished. She is not diaphoretic. She is active and cooperative.  Non-toxic appearance. She does not have a sickly appearance. She does not appear ill. No  distress.   HENT:   Head: Normocephalic and atraumatic.   Right Ear: Tympanic membrane normal.   Left Ear: Tympanic membrane normal.   Nose: Nose normal.   Mouth/Throat: Uvula is midline, oropharynx is clear and moist and mucous membranes are normal. No oral lesions. No uvula swelling. No oropharyngeal exudate, posterior oropharyngeal edema or posterior oropharyngeal erythema.   Eyes: Conjunctivae, EOM and lids are normal. Pupils are equal, round, and reactive to light. No scleral icterus.   Neck: Trachea normal and phonation normal. Neck supple. No thyroid mass present. No stridor present. No JVD present.   Normal range of motion.   Full passive range of motion without pain.     Cardiovascular: Normal rate, regular rhythm, normal heart sounds, intact distal pulses and normal pulses. Exam reveals no gallop, no distant heart sounds and no friction rub.    No murmur heard.  Pulmonary/Chest: Effort normal and breath sounds normal. No accessory muscle usage. No tachypnea. No respiratory distress. She has no wheezes. She has no rhonchi. She has no rales.   Abdominal: Abdomen is soft. Normal appearance and bowel sounds are normal. She exhibits no distension, no pulsatile midline mass and no mass. There is no abdominal tenderness. There is no rigidity, no guarding and no CVA tenderness.   Musculoskeletal:         General: No tenderness or edema. Normal range of motion.      Cervical back: Full passive range of motion without pain, normal range of motion and neck supple. No edema, erythema or rigidity. No spinous process tenderness or muscular tenderness. Normal range of motion.      Comments: Pulses are 2+ throughout, cap refill is less than 2 sec throughout, extremities are nontender throughout with full range of motion. There is no spinal tenderness to palpation.     Neurological: She is alert and oriented to person, place, and time. She has normal strength. She displays normal reflexes. No cranial nerve deficit or  sensory deficit. Gait normal. GCS score is 15. GCS eye subscore is 4. GCS verbal subscore is 5. GCS motor subscore is 6.   No focal deficits.   Skin: Skin is warm, dry and intact. Capillary refill takes less than 2 seconds. No ecchymosis, no petechiae and no rash noted. No erythema. No pallor.   Psychiatric: Judgment normal. Her mood appears anxious. Her affect is labile. Her speech is delayed. She is slowed and withdrawn. Thought content is not paranoid and not delusional. Cognition and memory are normal. She exhibits a depressed mood. She expresses suicidal ideation. She expresses no homicidal ideation.         ED Course   Procedures  Labs Reviewed   CBC W/ AUTO DIFFERENTIAL - Abnormal; Notable for the following components:       Result Value    WBC 13.45 (*)     Immature Granulocytes 0.8 (*)     Gran # (ANC) 9.4 (*)     Immature Grans (Abs) 0.11 (*)     All other components within normal limits   COMPREHENSIVE METABOLIC PANEL - Abnormal; Notable for the following components:    Glucose 119 (*)     Total Protein 8.7 (*)     Total Bilirubin 1.3 (*)     All other components within normal limits   SALICYLATE LEVEL - Abnormal; Notable for the following components:    Salicylate Lvl <4.0 (*)     All other components within normal limits   TSH   URINALYSIS, REFLEX TO URINE CULTURE    Narrative:     Specimen Source->Urine   ALCOHOL,MEDICAL (ETHANOL)   ACETAMINOPHEN LEVEL   DRUG SCREEN PANEL, URINE EMERGENCY   POCT URINE PREGNANCY          Imaging Results    None          Medications - No data to display  Medical Decision Making:   Clinical Tests:   Lab Tests: Reviewed  Radiological Study: Reviewed  Medical Tests: Reviewed                 Medically cleared for psychiatry placement: 1/20/2022 11:34 AM    Clinical Impression:   Final diagnoses:  [Z00.8] Medical clearance for psychiatric admission (Primary)  [R45.482] Suicidal ideation          ED Disposition Condition    Transfer to Psych Facility         ED Prescriptions      None        Follow-up Information    None          Kortney Ellison MD  01/20/22 0620

## 2022-09-12 ENCOUNTER — OFFICE VISIT (OUTPATIENT)
Dept: URGENT CARE | Facility: CLINIC | Age: 42
End: 2022-09-12
Payer: COMMERCIAL

## 2022-09-12 VITALS
RESPIRATION RATE: 20 BRPM | DIASTOLIC BLOOD PRESSURE: 86 MMHG | SYSTOLIC BLOOD PRESSURE: 153 MMHG | OXYGEN SATURATION: 98 % | HEART RATE: 91 BPM | TEMPERATURE: 98 F | WEIGHT: 198 LBS | BODY MASS INDEX: 36.44 KG/M2 | HEIGHT: 62 IN

## 2022-09-12 DIAGNOSIS — R52 BODY ACHES: Primary | ICD-10-CM

## 2022-09-12 DIAGNOSIS — Z20.822 COVID-19 VIRUS NOT DETECTED: ICD-10-CM

## 2022-09-12 DIAGNOSIS — Z20.822 SUSPECTED COVID-19 VIRUS INFECTION: ICD-10-CM

## 2022-09-12 DIAGNOSIS — R05.9 COUGH: ICD-10-CM

## 2022-09-12 PROBLEM — E78.2 MIXED HYPERLIPIDEMIA: Status: ACTIVE | Noted: 2021-08-25

## 2022-09-12 PROBLEM — R01.1 HEART MURMUR: Status: ACTIVE | Noted: 2021-08-10

## 2022-09-12 PROBLEM — K21.9 GASTROESOPHAGEAL REFLUX DISEASE WITHOUT ESOPHAGITIS: Status: ACTIVE | Noted: 2021-08-10

## 2022-09-12 PROBLEM — F32.A DEPRESSION: Status: ACTIVE | Noted: 2022-02-08

## 2022-09-12 LAB
CTP QC/QA: YES
SARS-COV-2 AG RESP QL IA.RAPID: NEGATIVE

## 2022-09-12 PROCEDURE — 3008F BODY MASS INDEX DOCD: CPT | Mod: CPTII,S$GLB,, | Performed by: NURSE PRACTITIONER

## 2022-09-12 PROCEDURE — 87811 SARS-COV-2 COVID19 W/OPTIC: CPT | Mod: QW,S$GLB,, | Performed by: NURSE PRACTITIONER

## 2022-09-12 PROCEDURE — 3008F PR BODY MASS INDEX (BMI) DOCUMENTED: ICD-10-PCS | Mod: CPTII,S$GLB,, | Performed by: NURSE PRACTITIONER

## 2022-09-12 PROCEDURE — 3077F SYST BP >= 140 MM HG: CPT | Mod: CPTII,S$GLB,, | Performed by: NURSE PRACTITIONER

## 2022-09-12 PROCEDURE — 3077F PR MOST RECENT SYSTOLIC BLOOD PRESSURE >= 140 MM HG: ICD-10-PCS | Mod: CPTII,S$GLB,, | Performed by: NURSE PRACTITIONER

## 2022-09-12 PROCEDURE — 1160F PR REVIEW ALL MEDS BY PRESCRIBER/CLIN PHARMACIST DOCUMENTED: ICD-10-PCS | Mod: CPTII,S$GLB,, | Performed by: NURSE PRACTITIONER

## 2022-09-12 PROCEDURE — 99214 OFFICE O/P EST MOD 30 MIN: CPT | Mod: S$GLB,,, | Performed by: NURSE PRACTITIONER

## 2022-09-12 PROCEDURE — 87811 SARS CORONAVIRUS 2 ANTIGEN POCT, MANUAL READ: ICD-10-PCS | Mod: QW,S$GLB,, | Performed by: NURSE PRACTITIONER

## 2022-09-12 PROCEDURE — 1160F RVW MEDS BY RX/DR IN RCRD: CPT | Mod: CPTII,S$GLB,, | Performed by: NURSE PRACTITIONER

## 2022-09-12 PROCEDURE — 1159F PR MEDICATION LIST DOCUMENTED IN MEDICAL RECORD: ICD-10-PCS | Mod: CPTII,S$GLB,, | Performed by: NURSE PRACTITIONER

## 2022-09-12 PROCEDURE — 3079F PR MOST RECENT DIASTOLIC BLOOD PRESSURE 80-89 MM HG: ICD-10-PCS | Mod: CPTII,S$GLB,, | Performed by: NURSE PRACTITIONER

## 2022-09-12 PROCEDURE — 3079F DIAST BP 80-89 MM HG: CPT | Mod: CPTII,S$GLB,, | Performed by: NURSE PRACTITIONER

## 2022-09-12 PROCEDURE — 1159F MED LIST DOCD IN RCRD: CPT | Mod: CPTII,S$GLB,, | Performed by: NURSE PRACTITIONER

## 2022-09-12 PROCEDURE — 99214 PR OFFICE/OUTPT VISIT, EST, LEVL IV, 30-39 MIN: ICD-10-PCS | Mod: S$GLB,,, | Performed by: NURSE PRACTITIONER

## 2022-09-12 RX ORDER — ZOLPIDEM TARTRATE 12.5 MG/1
TABLET, FILM COATED, EXTENDED RELEASE ORAL
COMMUNITY
Start: 2022-08-16

## 2022-09-12 RX ORDER — CETIRIZINE HYDROCHLORIDE 10 MG/1
10 TABLET ORAL DAILY
Qty: 30 TABLET | Refills: 0 | Status: SHIPPED | OUTPATIENT
Start: 2022-09-12 | End: 2022-10-25

## 2022-09-12 RX ORDER — FLUTICASONE PROPIONATE 50 MCG
1 SPRAY, SUSPENSION (ML) NASAL DAILY
Qty: 15.8 ML | Refills: 0 | Status: SHIPPED | OUTPATIENT
Start: 2022-09-12

## 2022-09-12 RX ORDER — PROMETHAZINE HYDROCHLORIDE AND DEXTROMETHORPHAN HYDROBROMIDE 6.25; 15 MG/5ML; MG/5ML
5 SYRUP ORAL EVERY 4 HOURS PRN
Qty: 118 ML | Refills: 0 | Status: SHIPPED | OUTPATIENT
Start: 2022-09-12 | End: 2022-09-22

## 2022-09-12 RX ORDER — BUSPIRONE HYDROCHLORIDE 30 MG/1
30 TABLET ORAL 3 TIMES DAILY
COMMUNITY

## 2022-09-12 RX ORDER — FLUOXETINE HYDROCHLORIDE 40 MG/1
40 CAPSULE ORAL DAILY
COMMUNITY

## 2022-09-12 RX ORDER — ALBUTEROL SULFATE 90 UG/1
2 AEROSOL, METERED RESPIRATORY (INHALATION) EVERY 6 HOURS PRN
Qty: 18 G | Refills: 0 | Status: SHIPPED | OUTPATIENT
Start: 2022-09-12

## 2022-09-12 RX ORDER — QUETIAPINE FUMARATE 25 MG/1
50 TABLET, FILM COATED ORAL NIGHTLY
COMMUNITY
Start: 2022-06-07 | End: 2022-10-25

## 2022-09-12 NOTE — PROGRESS NOTES
"Subjective:       Patient ID: Susana Presley is a 41 y.o. female.    Vitals:  height is 5' 2" (1.575 m) and weight is 89.8 kg (198 lb). Her oral temperature is 97.8 °F (36.6 °C). Her blood pressure is 153/86 (abnormal) and her pulse is 91. Her respiration is 20 and oxygen saturation is 98%.     Chief Complaint: Generalized Body Aches    Pt states "Body aches, weakness, dry cough, chest tightness/heaviness starting yesterday; tried Asprin, tylenol and ibuprofen with no relief."      Constitution: Positive for chills, sweating and fatigue. Negative for fever.   HENT:  Positive for congestion (Mild). Negative for ear pain (Bilateral fullness) and sore throat.    Neck: Negative for neck stiffness.   Cardiovascular:  Negative for chest pain.   Respiratory:  Positive for chest tightness and cough (Frequent, dry). Negative for sputum production.    Gastrointestinal:  Negative for nausea, vomiting and diarrhea.   Musculoskeletal:  Positive for muscle ache.   Skin:  Negative for rash.   Neurological:  Positive for dizziness and headaches.     Objective:      Physical Exam   Constitutional: She is oriented to person, place, and time. She appears well-developed.  Non-toxic appearance. She does not appear ill. No distress.   HENT:   Head: Normocephalic and atraumatic.   Ears:   Right Ear: Tympanic membrane, external ear and ear canal normal.   Left Ear: Tympanic membrane, external ear and ear canal normal.   Nose: Rhinorrhea and congestion present.   Mouth/Throat: Mucous membranes are moist. Posterior oropharyngeal erythema present. No oropharyngeal exudate.   Eyes: Conjunctivae and EOM are normal. Right eye exhibits no discharge. Left eye exhibits no discharge.   Neck: Neck supple. No neck rigidity present.   Cardiovascular: Normal rate, regular rhythm and normal heart sounds.   Pulmonary/Chest: Effort normal and breath sounds normal. No respiratory distress. She has no wheezes. She has no rhonchi. She has no rales.   Abdominal: " Normal appearance.   Musculoskeletal:      Cervical back: She exhibits no tenderness.   Lymphadenopathy:     She has no cervical adenopathy.   Neurological: no focal deficit. She is alert and oriented to person, place, and time.   Skin: Skin is warm, dry and not diaphoretic. Capillary refill takes 2 to 3 seconds.   Psychiatric: Her behavior is normal. Mood normal.   Nursing note and vitals reviewed.      Assessment:       1. Body aches    2. Cough    3. COVID-19 virus not detected    4. Suspected COVID-19 virus infection        1 COVID risk score    Plan:         Body aches  -     SARS Coronavirus 2 Antigen, POCT Manual Read    Cough    COVID-19 virus not detected    Suspected COVID-19 virus infection    Symptomatic treatment to include:    Rest, increase fluid intake to include 50 % water, 50% electrolyte replacement  Ibuprofen/Tylenol as directed for fever, sore throat, headache, body aches.  Tylenol helps with fever but ibuprofen or aleve helps best for other symptoms.   Always take ibuprofen and or Aleve with food as repeated use can cause stomach irritation.  It is also advised to start taking Pepcid 20 mg over-the-counter twice a day for 7-10 days whenever taking NSAIDs for extended times for stomach protection  Zrytec and flonase for sinus symptoms and to reduce inflammation.   Phenergan cough syrup at night for cough.  Will cause drowsiness  Tessalon perles cough pills as needed day or night.  Can be taken together with cough syrup if desired.  Helps best for dry, throat irritation cough.  Mucinex D over the counter as directed for sinus congestion.  C  Warm, salt water gargles, over the counter throat lozenges or sprays as desires.   Liquid benadryl and maalox 1 to 1 concentration, gargle and spit for temporary relief for sore throat.  Imodium over the counter as directed for diarrhea if desired.  ER for difficulty breathing not relieved by rest, excessive lethargy and/or change in mental status  Albuterol  inhaler every 6 hours while awake for chest tightness/heaviness, constant coughing, shortness of breath, difficulty breathing, wheezing  Follow CDC isolation guidelines as provided     Patient Instructions   COVID-19 patients are required by the CDC to undergo isolation for 5 days, then wearing a mask around others for an additional 5 days, after their symptoms first began following the new updated guidelines of 12/27/2021. This isolation starts from the day you first developed symptoms, not the day of your positive test. For example, if your symptoms began on a Monday but tested positive on the following Wednesday, your 5-day isolation begins from that Monday, not the Wednesday you tested positive.  However, if you are asymptomatic (a person who does not have any symptoms) and COVID-19 positive, your 5-day isolation begins on the day you tested positive, regardless of exposure date.  Also, per the CDC guidelines, once your 5 days have passed, symptoms have resolved or are improving, and you have not had fever greater than 100.4F in the last 24 hours without taking any fever reducers such as Tylenol (Acetaminophen) or Motrin (Ibuprofen), you may return to your normal activities including social distancing, wearing masks, and frequent handwashing - YOU DO NOT NEED ANOTHER TEST IN ORDER TO END YOUR QUARANTINE.

## 2022-09-12 NOTE — LETTER
September 12, 2022      [facility]  [address]  [facility] [address]  Phone: [phone]       Patient: Susana Presley   YOB: 1980  Date of Visit: 09/12/2022    To Whom It May Concern:    Becky Presley  was at [facility] on 09/12/2022. The patient may return to work/school on 09/17/2022 as long as symptoms are improving and no fever for 24 hours without fever reducing medications with an additional 5 days of asking well around others. If you have any questions or concerns, or if I can be of further assistance, please do not hesitate to contact me.    Sincerely,    Teetee Engel, NP

## 2022-09-12 NOTE — PATIENT INSTRUCTIONS
Symptomatic treatment to include:    Rest, increase fluid intake to include 50 % water, 50% electrolyte replacement  Ibuprofen/Tylenol as directed for fever, sore throat, headache, body aches.  Tylenol helps with fever but ibuprofen or aleve helps best for other symptoms.   Always take ibuprofen and or Aleve with food as repeated use can cause stomach irritation.  It is also advised to start taking Pepcid 20 mg over-the-counter twice a day for 7-10 days whenever taking NSAIDs for extended times for stomach protection  Zrytec and flonase for sinus symptoms and to reduce inflammation.   Phenergan cough syrup at night for cough.  Will cause drowsiness  Tessalon perles cough pills as needed day or night.  Can be taken together with cough syrup if desired.  Helps best for dry, throat irritation cough.  Mucinex D over the counter as directed for sinus congestion.  C  Warm, salt water gargles, over the counter throat lozenges or sprays as desires.   Liquid benadryl and maalox 1 to 1 concentration, gargle and spit for temporary relief for sore throat.  Imodium over the counter as directed for diarrhea if desired.  ER for difficulty breathing not relieved by rest, excessive lethargy and/or change in mental status  Albuterol inhaler every 6 hours while awake for chest tightness/heaviness, constant coughing, shortness of breath, difficulty breathing, wheezing  Follow CDC isolation guidelines as provided     Patient Instructions   COVID-19 patients are required by the CDC to undergo isolation for 5 days, then wearing a mask around others for an additional 5 days, after their symptoms first began following the new updated guidelines of 12/27/2021. This isolation starts from the day you first developed symptoms, not the day of your positive test. For example, if your symptoms began on a Monday but tested positive on the following Wednesday, your 5-day isolation begins from that Monday, not the Wednesday you tested  positive.  However, if you are asymptomatic (a person who does not have any symptoms) and COVID-19 positive, your 5-day isolation begins on the day you tested positive, regardless of exposure date.  Also, per the CDC guidelines, once your 5 days have passed, symptoms have resolved or are improving, and you have not had fever greater than 100.4F in the last 24 hours without taking any fever reducers such as Tylenol (Acetaminophen) or Motrin (Ibuprofen), you may return to your normal activities including social distancing, wearing masks, and frequent handwashing - YOU DO NOT NEED ANOTHER TEST IN ORDER TO END YOUR QUARANTINE.

## 2022-10-10 ENCOUNTER — OFFICE VISIT (OUTPATIENT)
Dept: OTOLARYNGOLOGY | Facility: CLINIC | Age: 42
End: 2022-10-10
Payer: COMMERCIAL

## 2022-10-10 VITALS — WEIGHT: 199.31 LBS | BODY MASS INDEX: 35.32 KG/M2 | HEIGHT: 63 IN

## 2022-10-10 DIAGNOSIS — J34.89 NASAL VALVE BLOCKAGE: ICD-10-CM

## 2022-10-10 DIAGNOSIS — J34.89 RHINORRHEA: Primary | ICD-10-CM

## 2022-10-10 DIAGNOSIS — R51.9 NONINTRACTABLE EPISODIC HEADACHE, UNSPECIFIED HEADACHE TYPE: ICD-10-CM

## 2022-10-10 PROCEDURE — 99999 PR PBB SHADOW E&M-EST. PATIENT-LVL V: ICD-10-PCS | Mod: PBBFAC,,, | Performed by: PHYSICIAN ASSISTANT

## 2022-10-10 PROCEDURE — 31231 NASAL/SINUS ENDOSCOPY: ICD-10-PCS | Mod: S$GLB,,, | Performed by: PHYSICIAN ASSISTANT

## 2022-10-10 PROCEDURE — 99204 OFFICE O/P NEW MOD 45 MIN: CPT | Mod: 25,S$GLB,, | Performed by: PHYSICIAN ASSISTANT

## 2022-10-10 PROCEDURE — 1160F RVW MEDS BY RX/DR IN RCRD: CPT | Mod: CPTII,S$GLB,, | Performed by: PHYSICIAN ASSISTANT

## 2022-10-10 PROCEDURE — 31231 NASAL ENDOSCOPY DX: CPT | Mod: S$GLB,,, | Performed by: PHYSICIAN ASSISTANT

## 2022-10-10 PROCEDURE — 99204 PR OFFICE/OUTPT VISIT, NEW, LEVL IV, 45-59 MIN: ICD-10-PCS | Mod: 25,S$GLB,, | Performed by: PHYSICIAN ASSISTANT

## 2022-10-10 PROCEDURE — 1159F PR MEDICATION LIST DOCUMENTED IN MEDICAL RECORD: ICD-10-PCS | Mod: CPTII,S$GLB,, | Performed by: PHYSICIAN ASSISTANT

## 2022-10-10 PROCEDURE — 1159F MED LIST DOCD IN RCRD: CPT | Mod: CPTII,S$GLB,, | Performed by: PHYSICIAN ASSISTANT

## 2022-10-10 PROCEDURE — 1160F PR REVIEW ALL MEDS BY PRESCRIBER/CLIN PHARMACIST DOCUMENTED: ICD-10-PCS | Mod: CPTII,S$GLB,, | Performed by: PHYSICIAN ASSISTANT

## 2022-10-10 PROCEDURE — 99999 PR PBB SHADOW E&M-EST. PATIENT-LVL V: CPT | Mod: PBBFAC,,, | Performed by: PHYSICIAN ASSISTANT

## 2022-10-10 RX ORDER — IPRATROPIUM BROMIDE 21 UG/1
2 SPRAY, METERED NASAL 3 TIMES DAILY
Qty: 30 ML | Refills: 2 | Status: SHIPPED | OUTPATIENT
Start: 2022-10-10

## 2022-10-10 NOTE — PROCEDURES
Nasal/sinus endoscopy    Date/Time: 10/10/2022 2:30 PM  Performed by: Maxwell Berry PA-C  Authorized by: Maxwell Berry PA-C     Anesthesia:     Local anesthetic:  Lidocaine 4% and Leonel-Synephrine 1/2%    Location: bilateral nares.    Type of Endoscope:  Flexible (disposable ambu scope)    Patient tolerance:  Patient tolerated the procedure well with no immediate complications  Nose:     Procedure Performed:  Nasal Endoscopy  External:      No external nasal deformity  Intranasal:      Mucosa no polyps     Mucosa ulcers not present     No mucosa lesions present     Turbinates not enlarged     Septum gross deformity  Nasopharynx:      No mucosa lesions     Posterior choanae patent     Eustachian tube patent

## 2022-10-10 NOTE — PATIENT INSTRUCTIONS
ipratropium (ATROVENT) 21 mcg (0.03 %) nasal spray 30 mL 2 10/10/2022  No   Sig - Route: 2 sprays by Nasal route 3 (three) times daily. As needed for clear runny nose - Nasal

## 2022-10-10 NOTE — PROGRESS NOTES
[facility]    Subjective:      Patient: Susana Presley Patient PCP: Mildred Lugo MD         :  1980     Sex:  female      MRN:  [id number]          Date of Visit: 10/10/2022      Chief Complaint: Left nasal blockage/eye issues    Patient ID: Susana Presley is a 42 y.o. female who presents to clinic for left nasal blockage/eye issues. Pt states that she has had left nasal blockage for a few years and has had left intranasal pain for the past year. Pt states that she has chronic eye watering. Pt states that she has bilateral cheek pressure for years. Pt has clear runny nose that happens at random. Pt denies fever/chills, yellow/green nasal discharge or double vision. Pt states that she has had daily headaches in the morning-helped with BC powder. Pt states she gets visual aura in both eyes with headaches. Pt states that her headaches have been occurring since she had covid19. Pt endorses allergy symptoms in the spring time and fall.     Past Medical History  She has a past medical history of Anxiety.    Family History  Her family history includes Diabetes in her mother; Hyperlipidemia in her father; Hypertension in her mother.    Past Surgical History:   Procedure Laterality Date    TONSILLECTOMY      TUBAL LIGATION       Social History     Tobacco Use    Smoking status: Former     Types: Cigarettes     Quit date: 2016     Years since quittin.7    Smokeless tobacco: Never   Substance and Sexual Activity    Alcohol use: Yes     Comment: rarely    Drug use: Not on file    Sexual activity: Not on file     Medications  She has a current medication list which includes the following prescription(s): albuterol, alprazolam, buspirone, cetirizine, fluoxetine, fluticasone propionate, gemfibrozil, ibuprofen, medroxyprogesterone, naproxen, pantoprazole, promethazine hcl, pseudoephedrine-guaifenesin  mg, quetiapine, and zolpidem.    Review of patient's allergies indicates:  No Known Allergies  All  medications, allergies, and past history have been reviewed.    Objective:      Vitals:  Vitals - 1 value per visit 9/12/2022 10/10/2022 10/10/2022   SYSTOLIC 153 - -   DIASTOLIC 86 - -   Pulse 91 - -   Temp 97.8 - -   Resp 20 - -   SPO2 98 - -   Weight (lb) 198 - 199.3   Weight (kg) 89.812 - 90.4   Height 62 - 63   BMI (Calculated) 36.2 - 35.3   VISIT REPORT - - -   Pain Score  - 5 -       Body surface area is 2 meters squared.  Physical Exam  Constitutional:       General: She is not in acute distress.     Appearance: Normal appearance. She is not ill-appearing.   HENT:      Head: Normocephalic and atraumatic.      Right Ear: Tympanic membrane, ear canal and external ear normal.      Left Ear: Tympanic membrane, ear canal and external ear normal.      Nose: Septal deviation (left) present.      Mouth/Throat:      Lips: Pink. No lesions.      Mouth: Mucous membranes are moist. No oral lesions.      Tongue: No lesions.      Palate: No lesions.      Pharynx: Oropharynx is clear. Uvula midline. No pharyngeal swelling, oropharyngeal exudate, posterior oropharyngeal erythema or uvula swelling.   Eyes:      General:         Right eye: No discharge.         Left eye: No discharge.      Extraocular Movements: Extraocular movements intact.      Conjunctiva/sclera: Conjunctivae normal.   Pulmonary:      Effort: Pulmonary effort is normal.   Neurological:      General: No focal deficit present.      Mental Status: She is alert and oriented to person, place, and time. Mental status is at baseline.   Psychiatric:         Mood and Affect: Mood normal.         Behavior: Behavior normal.         Thought Content: Thought content normal.         Judgment: Judgment normal.   Modified donn maneuver: Pt denies improvement in breathing through nose with modified donn maneuver.     Nasal/sinus endoscopy     Date/Time: 10/10/2022 2:30 PM  Performed by: Maxwell Berry PA-C  Authorized by: Maxwell Berry PA-C       Anesthesia:     Local anesthetic:  Lidocaine 4% and Leonel-Synephrine 1/2%    Location: bilateral nares.    Type of Endoscope:  Flexible (disposable ambu scope)    Patient tolerance:  Patient tolerated the procedure well with no immediate complications  Nose:     Procedure Performed:  Nasal Endoscopy  External:      No external nasal deformity  Intranasal:      Mucosa no polyps     Mucosa ulcers not present     No mucosa lesions present     Turbinates not enlarged     Septum gross deformity  Nasopharynx:      No mucosa lesions     Posterior choanae patent     Eustachian tube patent    Labs:  WBC   Date Value Ref Range Status   01/20/2022 13.45 (H) 3.90 - 12.70 K/uL Final     Eosinophil %   Date Value Ref Range Status   01/20/2022 2.3 0.0 - 8.0 % Final     Eos #   Date Value Ref Range Status   01/20/2022 0.3 0.0 - 0.5 K/uL Final     Platelets   Date Value Ref Range Status   01/20/2022 301 150 - 450 K/uL Final     Glucose   Date Value Ref Range Status   01/20/2022 119 (H) 70 - 110 mg/dL Final     All lab results, imaging results, and data have been reviewed.    Assessment:        ICD-10-CM ICD-9-CM   1. Nonintractable episodic headache, unspecified headache type  R51.9 784.0   2. Nasal valve blockage  J34.89 478.19            Plan:      Nonintractable episodic headache, unspecified headache type  -     Ambulatory referral/consult to Neurology; Future; Expected date: 10/17/2022  -     MRI Brain W WO Contrast; Future; Expected date: 10/10/2022    Nasal valve blockage  -     Nasal/sinus endoscopy unremarkable for growths or polyps. Nasal dilator information given in AVS to see if this helps pt as her left nasal blockage affects her most at night.    Pt may continue flonase and zyrtec for allergy symptoms. Atrovent nasal spray 2 sprays three times a day as needed for episodes of clear runny nose. Pt states that although her clear runny nose happens sporadically at random, she notices that she sometimes has clear runny nose  when bending over to get things. Pt states that her nose runs like a faucet. Pt denies saltwater taste. MRI Brain w/wo to further assess new onset headaches since having [id number] assess sinuses on MRI, also. If clear runny nose continues to happen when bending over, then will request collection for beta 2 transferrin testing. Will reach out to pt with results and recommendations of MRI brain w/wo.

## 2022-10-17 ENCOUNTER — OFFICE VISIT (OUTPATIENT)
Dept: NEUROLOGY | Facility: CLINIC | Age: 42
End: 2022-10-17
Payer: COMMERCIAL

## 2022-10-17 VITALS
WEIGHT: 199.19 LBS | BODY MASS INDEX: 35.29 KG/M2 | RESPIRATION RATE: 17 BRPM | HEART RATE: 102 BPM | SYSTOLIC BLOOD PRESSURE: 122 MMHG | DIASTOLIC BLOOD PRESSURE: 88 MMHG | HEIGHT: 63 IN

## 2022-10-17 DIAGNOSIS — G43.009 MIGRAINE WITHOUT AURA AND WITHOUT STATUS MIGRAINOSUS, NOT INTRACTABLE: ICD-10-CM

## 2022-10-17 DIAGNOSIS — G47.9 TROUBLE IN SLEEPING: ICD-10-CM

## 2022-10-17 DIAGNOSIS — G44.40 REBOUND HEADACHE: Primary | ICD-10-CM

## 2022-10-17 DIAGNOSIS — R51.9 NONINTRACTABLE EPISODIC HEADACHE, UNSPECIFIED HEADACHE TYPE: ICD-10-CM

## 2022-10-17 DIAGNOSIS — M79.18 MYOFASCIAL PAIN: ICD-10-CM

## 2022-10-17 PROCEDURE — 3074F SYST BP LT 130 MM HG: CPT | Mod: CPTII,S$GLB,, | Performed by: PHYSICIAN ASSISTANT

## 2022-10-17 PROCEDURE — 99999 PR PBB SHADOW E&M-EST. PATIENT-LVL V: CPT | Mod: PBBFAC,,, | Performed by: PHYSICIAN ASSISTANT

## 2022-10-17 PROCEDURE — 1159F MED LIST DOCD IN RCRD: CPT | Mod: CPTII,S$GLB,, | Performed by: PHYSICIAN ASSISTANT

## 2022-10-17 PROCEDURE — 99999 PR PBB SHADOW E&M-EST. PATIENT-LVL V: ICD-10-PCS | Mod: PBBFAC,,, | Performed by: PHYSICIAN ASSISTANT

## 2022-10-17 PROCEDURE — 1160F RVW MEDS BY RX/DR IN RCRD: CPT | Mod: CPTII,S$GLB,, | Performed by: PHYSICIAN ASSISTANT

## 2022-10-17 PROCEDURE — 99204 OFFICE O/P NEW MOD 45 MIN: CPT | Mod: S$GLB,,, | Performed by: PHYSICIAN ASSISTANT

## 2022-10-17 PROCEDURE — 1160F PR REVIEW ALL MEDS BY PRESCRIBER/CLIN PHARMACIST DOCUMENTED: ICD-10-PCS | Mod: CPTII,S$GLB,, | Performed by: PHYSICIAN ASSISTANT

## 2022-10-17 PROCEDURE — 99204 PR OFFICE/OUTPT VISIT, NEW, LEVL IV, 45-59 MIN: ICD-10-PCS | Mod: S$GLB,,, | Performed by: PHYSICIAN ASSISTANT

## 2022-10-17 PROCEDURE — 1159F PR MEDICATION LIST DOCUMENTED IN MEDICAL RECORD: ICD-10-PCS | Mod: CPTII,S$GLB,, | Performed by: PHYSICIAN ASSISTANT

## 2022-10-17 PROCEDURE — 3079F PR MOST RECENT DIASTOLIC BLOOD PRESSURE 80-89 MM HG: ICD-10-PCS | Mod: CPTII,S$GLB,, | Performed by: PHYSICIAN ASSISTANT

## 2022-10-17 PROCEDURE — 3079F DIAST BP 80-89 MM HG: CPT | Mod: CPTII,S$GLB,, | Performed by: PHYSICIAN ASSISTANT

## 2022-10-17 PROCEDURE — 3074F PR MOST RECENT SYSTOLIC BLOOD PRESSURE < 130 MM HG: ICD-10-PCS | Mod: CPTII,S$GLB,, | Performed by: PHYSICIAN ASSISTANT

## 2022-10-17 RX ORDER — PREDNISONE 20 MG/1
TABLET ORAL
Qty: 12 TABLET | Refills: 0 | Status: SHIPPED | OUTPATIENT
Start: 2022-10-17 | End: 2024-01-28

## 2022-10-17 RX ORDER — RIZATRIPTAN BENZOATE 10 MG/1
TABLET, ORALLY DISINTEGRATING ORAL
Qty: 10 TABLET | Refills: 5 | Status: SHIPPED | OUTPATIENT
Start: 2022-10-17 | End: 2022-11-03

## 2022-10-17 RX ORDER — TIZANIDINE 2 MG/1
TABLET ORAL
Qty: 60 TABLET | Refills: 4 | Status: SHIPPED | OUTPATIENT
Start: 2022-10-17

## 2022-10-17 NOTE — PATIENT INSTRUCTIONS
"        Please consider headache melody or migraine melody to track the headaches      To reduce headaches:  Continue on your mood medicine  Try the tizanidine (zanaflex) 1 pill approx 1-2 hours before bed every night, if after a week, no benefits/no side effects, move up to 2 pills  Physical therapy ordered       To abort headaches:  No over the counter medicine (at least no more than 3 days use in week)  Instead try the maxalt (rizatriptan) 1 melt at onset headache, second melt 2 hours later if headache persists, no more then 2 melts day/3 days use in week       To "factory reset"  Starting tomorrow on full stomach at breakfast time only, try the deltasone (prednisone): 3 pills for 2 days, 2 pills for 2 days, 1 pill for 2 days, then off   "

## 2022-10-17 NOTE — PROGRESS NOTES
[facility]  [address] [place] [address]  Phone:[phone]  Fax: [phone]   New Patient Consultation    Patient Name: Susana Presley  : 1980  MRN:  [id number]  Today: 10/17/2022   chief complaint: Headache    PCP: Mildred Lugo MD.  Approx time examiner entered room: 8:09 AM  Approx time examiner departed room:8:37 AM  Approx 17 mins pre/post visit charting       Assessment:   Susana Presley is a 42 y.o. right handed femalewith a PMHx of: anxiety, HA, HLD, insomnia and past COVID19   whom presents solo at the request of ENT for HA. HA appear to be mixed migrainous and cervicogenic, from her discussion, after COVID19 infection this past summer. Myofascial pain, lack of sleep (which she attributes to inability to breath, s/p COVID, imaging studies and further work up pending from ENT per patient) and rebound cephalgia along with anxiety/depression likely contribute.       Review:    ICD-10-CM ICD-9-CM   1. Rebound headache  G44.40 339.3   2. Migraine without aura and without status migrainosus, not intractable  G43.009 346.10   3. Myofascial pain  M79.18 729.1   4. Trouble in sleeping  G47.9 780.50   5. Nonintractable episodic headache, unspecified headache type  R51.9 784.0         Plan:   Discussed realistic goals of care with patient at length. Discussed medication options, need for lifestyle adjustment. Discussed treatment will take time. Goal will be to reduce frequency/intensity/quantity of HA, not to be completely HA free. Gave copy of S triggers for migraine informational sheet, and discussed clinic's non narcotic policy re: HA. Patient voiced understanding and agreement.            -will have patient track HA, discussed jamey for smart phone           -will have patient work on lifestyle           -imaging studies already pending (will review with her at next visit)            - potential for teratogenicity with  "treatment,-documented tubal ligation     For HA Prevention:  1 continue mood medications per other providers  2 wrote for zanaflex (discussed adv effects/dosing, not to use with etoh and not drive with it, can self titrate up to 4 mg Q2h before bed, to help with HA, lack of sleep and myosfascial pain). She agreed to try.   3 PT ordered    For HA :  1 cut back on OTC to <3 days use in week, discussed rebound at length  2 maxalt MLT written, discussed adv effects/dosing, she agreed    To break up Headaches:  Course of deltasone written, on full stomach at breakfast time only (start tomorrow, and take whole course even if feeling better), discussed adv effects/dosing, she agreed     Other:  [facility] if [facility]           All test results as well as any necessary instructions were reviewed and discussed with patient.    Review:  Orders Placed This Encounter    Ambulatory referral/consult to Physical/Occupational Therapy    predniSONE (DELTASONE) 20 MG tablet    tiZANidine (ZANAFLEX) 2 MG tablet    rizatriptan (MAXALT-MLT) 10 MG disintegrating tablet         Patient to return to PCP/other specialists for all other problems  Patient to continue on all medications as Rx'd  A detailed AVS was provided to the patient with patient readback   RTO- 2-3 months to review HA log and check in   The patient indicates understanding of these issues and agrees to the plan.    HPI:   Susana Presley is a 42 y.o.right handed, female with a PMHx of: anxiety, HA, HLD, insomnia and past COVID19   whom presents solo at the request of ENT for HA.          HA HPI:  Start:HA started in /July after COVID19, no true HAs before this occurred   History of trauma (no), History of CNS infection (no), History of Stroke (no)  Location:frontalis, temporalis and occipitalis, "like ahalo" around the head   Severity: range: 3-8/10  Duration:all day (intensity changes throughout the day) "   Frequency:30 HD/30   Associated factors (bolded positive) WITH HA (or migraine): Nausea, vomiting, photophobia, phonophobia, tinnitus, scalp pain, vision loss, diplopia, scintillations, eye pain, jaw pain, weakness?    Tried:BC powder/ibuprofen daily   Triggers (in bold): stress, lack of sleep, too much caffeine, too little caffeine, weather change, seasonal change, strong odours, bright lights, sunlight, food    Currently having a HA?:yes   Positives in bold: Hx of Kidney Stones, asthma, GI bleed, osteoporosis, CAD/MI, CVA/TIA, DM  <---denies   Imaging on file: imaging studies pending   Therapies tried in past: (failures to be marked, if known---why did it fail?)  Fluoxetine  BC powder  OTC  buspar  Seroquel  Ambien  soma    Medication Reconciliation:   Current Outpatient Medications   Medication Sig Dispense Refill    albuterol (VENTOLIN HFA) 90 mcg/actuation inhaler Inhale 2 puffs into the lungs every 6 (six) hours as needed for Wheezing. Rescue 18 g 0    busPIRone (BUSPAR) 30 MG Tab Take 30 mg by mouth 3 (three) times daily.      clonazepam (KLONOPIN ORAL) Take by mouth.      FLUoxetine 40 MG capsule Take 40 mg by mouth once daily.      fluticasone propionate (FLONASE) 50 mcg/actuation nasal spray 1 spray (50 mcg total) by Each Nostril route once daily. 15.8 mL 0    gemfibroziL (LOPID) 600 MG tablet Take 600 mg by mouth 2 (two) times daily before meals.      ibuprofen (ADVIL,MOTRIN) 200 MG tablet Take 200 mg by mouth every 6 (six) hours as needed.      ipratropium (ATROVENT) 21 mcg (0.03 %) nasal spray 2 sprays by Nasal route 3 (three) times daily. As needed for clear runny nose 30 mL 2    medroxyPROGESTERone (DEPO-PROVERA) 150 mg/mL injection Inject 150 mg into the muscle every 3 (three) months.      naproxen (NAPROSYN) 500 MG tablet Take 1 tablet by mouth 2 (two) times daily with meals.      pantoprazole (PROTONIX) 40 MG tablet Take 1 tablet by mouth Daily.      promethazine HCl (PHENERGAN ORAL) Take 1  tablet by mouth once daily.      pseudoephedrine-guaiFENesin  mg (MUCINEX D)  mg per tablet Take 1 tablet by mouth.      QUEtiapine (SEROQUEL) 25 MG Tab Take 50 mg by mouth every evening.      zolpidem (AMBIEN CR) 12.5 MG CR tablet SMARTSI Tablet(s) By Mouth Every Evening PRN      cetirizine (ZYRTEC) 10 MG tablet Take 1 tablet (10 mg total) by mouth once daily. 30 tablet 0    predniSONE (DELTASONE) 20 MG tablet On full stomach (breakfast): 3 tabs for 2 days, 2 tabs for 2 days, 1 tab for 2 days. Finish 12 tablet 0    rizatriptan (MAXALT-MLT) 10 MG disintegrating tablet 1 melt at onset headache, second melt 2 hours later if needed, no more than 2 melts day/ 3days 10 tablet 5    tiZANidine (ZANAFLEX) 2 MG tablet 1-2 pills approx 1-2 hours before bed, as needed for neck pain and headaches 60 tablet 4     No current facility-administered medications for this visit.     Review of patient's allergies indicates:  No Known Allergies    PMHx:  Past Medical History:   Diagnosis Date    Anxiety      Past Surgical History:   Procedure Laterality Date    TONSILLECTOMY      TUBAL LIGATION         Fhx:  Family History   Problem Relation Age of Onset    Diabetes Mother     Hypertension Mother     Hyperlipidemia Father        Shx:   Social History     Socioeconomic History    Marital status:    Tobacco Use    Smoking status: Former     Types: Cigarettes     Quit date:      Years since quittin.7    Smokeless tobacco: Never   Substance and Sexual Activity    Alcohol use: Yes     Comment: rarely           Labs:   Reviewed in chart     Imaging:   Reviewed in chart       Other testing:  Reviewed in chart     Note: I have independently reviewed any/all imaging/labs/tests and agree with the report (s) as documented.  Any discrepancies will be as noted/demarcated by free text.  CARMEN GALAN 10/17/2022                     ROS:   Review Of Systems (questions asked, positive or additions in BOLD)  Gen: Weight change,  "fatigue/malaise, pyrexia   HEENT: Tinnitus, headache,  blurred vision, eye pain, diplopia, photophobia,  nose bleeds, congestion, sore throat, jaw pain, scalp pain, neck stiffness   Card: Palpitations, CP   Pulm: SOB, cough <---at times, s/p COVID, not currently experiencing    Vas: Easy bruising, easy bleeding   GI: N/V/D/C, incontinence, hematemesis, hematochezia    : incontinence, hematuria   Endocrine: Temp intolerance, polyuria, polydipsia   M/S: Neck pain, myalgia, back pain, joint pain, falls    Neuro: PER HPI   PSY: Memory loss, confusion, depression, anxiety, trouble in sleep          EXAM:   /88 (BP Location: Right arm, Patient Position: Sitting, BP Method: Large (Automatic))   Pulse 102   Resp 17   Ht 5' 3" (1.6 m)   Wt 90.4 kg (199 lb 3 oz)   BMI 35.28 kg/m²    GEN:  NAD  HEENT: NC/AT, Frontalis was NTTP, temporalis was mildly TTP on the right, vertex NTTP,  nares patent, dentition appropriate,  glossus piercing, neck supple, trachea midline, Occiput and trapezius  TTP, sits with head forward posture      EXTREM:  no edema present.    NEURO:  Mental Status:  Awake, alert and appropriately oriented to time, place, and person.  Normal attention and concentration.  Speech is fluent and appropriate language function (I.e., comprehension). Anxious.     Cranial Nerves:    Extraocular movements are intact and without nystagmus.  Visual fields are full to confrontation testing. Colour vision change not found.  Facial movement is symmetric.  Facial sensation is intact.  Hearing is normal. Uvula in midline. DROM of neck in all (6) directions, shoulder shrug symmetrical. Tongue in midline without fasiculation.     Motor:  RUE:appropriate against gravity and medium force as tested 5/5              LUE: appropriate against gravity and medium force as tested 5/5              RLE:appropriate against gravity and medium force as tested 5/5              LLE: appropriate against gravity and medium force as " tested 5/5  Tremor/pronator drift not apparent.   Finger extension strength was strong bilat     Sensory:  RUE  intact light touch, proprioception, and temperature  LUE intact light touch, proprioception, and temperature    RLE intact light touch  LLE intact light touch      DTR's:                                            R              L  biceps 2+ 2+         brachioradialis 2+ 2+   Knee jerk 2+ 2+        Coordination:  FTN-WNL.      Gait and Stance: Normal manner of stance and gait function testing. Romberg was negative .          This document has been electronically signed by  Osman CHÁVEZMoon Frankel MPA, PA-C on 10/17/2022, I have personally typed this message using the EMR.       Dr Juanito MD  was available during today's visit.     Personal Protective Equipment:    Personal Protective Equipment was used during this encounter including:  mask-surgical and non latex gloves.          CC: Mildred Lugo MD/AZALEA Berry

## 2022-10-24 ENCOUNTER — HOSPITAL ENCOUNTER (OUTPATIENT)
Dept: RADIOLOGY | Facility: HOSPITAL | Age: 42
Discharge: HOME | End: 2022-10-24
Attending: PHYSICIAN ASSISTANT
Payer: COMMERCIAL

## 2022-10-24 DIAGNOSIS — R51.9 NONINTRACTABLE EPISODIC HEADACHE, UNSPECIFIED HEADACHE TYPE: ICD-10-CM

## 2022-10-24 PROCEDURE — 70553 MRI BRAIN STEM W/O & W/DYE: CPT | Mod: TC

## 2022-10-24 PROCEDURE — 25500020 PHARM REV CODE 255: Performed by: PHYSICIAN ASSISTANT

## 2022-10-24 PROCEDURE — 70553 MRI BRAIN W WO CONTRAST: ICD-10-PCS | Mod: 26,,, | Performed by: RADIOLOGY

## 2022-10-24 PROCEDURE — 70553 MRI BRAIN STEM W/O & W/DYE: CPT | Mod: 26,,, | Performed by: RADIOLOGY

## 2022-10-24 PROCEDURE — A9585 GADOBUTROL INJECTION: HCPCS | Performed by: PHYSICIAN ASSISTANT

## 2022-10-24 RX ORDER — GADOBUTROL 604.72 MG/ML
9 INJECTION INTRAVENOUS
Status: COMPLETED | OUTPATIENT
Start: 2022-10-24 | End: 2022-10-24

## 2022-10-24 RX ADMIN — GADOBUTROL 9 ML: 604.72 INJECTION INTRAVENOUS at 05:10

## 2022-10-25 ENCOUNTER — OFFICE VISIT (OUTPATIENT)
Dept: PAIN MEDICINE | Facility: CLINIC | Age: 42
End: 2022-10-25
Payer: COMMERCIAL

## 2022-10-25 ENCOUNTER — PATIENT MESSAGE (OUTPATIENT)
Dept: OTOLARYNGOLOGY | Facility: CLINIC | Age: 42
End: 2022-10-25
Payer: COMMERCIAL

## 2022-10-25 VITALS
RESPIRATION RATE: 16 BRPM | WEIGHT: 202.5 LBS | HEART RATE: 99 BPM | HEIGHT: 63 IN | DIASTOLIC BLOOD PRESSURE: 85 MMHG | SYSTOLIC BLOOD PRESSURE: 131 MMHG | BODY MASS INDEX: 35.88 KG/M2 | OXYGEN SATURATION: 98 %

## 2022-10-25 DIAGNOSIS — M70.61 TROCHANTERIC BURSITIS, RIGHT HIP: ICD-10-CM

## 2022-10-25 DIAGNOSIS — M25.561 CHRONIC PAIN OF RIGHT KNEE: ICD-10-CM

## 2022-10-25 DIAGNOSIS — M79.641 RIGHT HAND PAIN: ICD-10-CM

## 2022-10-25 DIAGNOSIS — G89.29 CHRONIC PAIN OF RIGHT KNEE: ICD-10-CM

## 2022-10-25 PROCEDURE — 1160F RVW MEDS BY RX/DR IN RCRD: CPT | Mod: CPTII,S$GLB,, | Performed by: PAIN MEDICINE

## 2022-10-25 PROCEDURE — 3079F PR MOST RECENT DIASTOLIC BLOOD PRESSURE 80-89 MM HG: ICD-10-PCS | Mod: CPTII,S$GLB,, | Performed by: PAIN MEDICINE

## 2022-10-25 PROCEDURE — 99204 PR OFFICE/OUTPT VISIT, NEW, LEVL IV, 45-59 MIN: ICD-10-PCS | Mod: 25,S$GLB,, | Performed by: PAIN MEDICINE

## 2022-10-25 PROCEDURE — 20610 PR DRAIN/INJECT LARGE JOINT/BURSA: ICD-10-PCS | Mod: RT,S$GLB,, | Performed by: PAIN MEDICINE

## 2022-10-25 PROCEDURE — 20610 DRAIN/INJ JOINT/BURSA W/O US: CPT | Mod: RT,S$GLB,, | Performed by: PAIN MEDICINE

## 2022-10-25 PROCEDURE — 99999 PR PBB SHADOW E&M-EST. PATIENT-LVL V: CPT | Mod: PBBFAC,,, | Performed by: PAIN MEDICINE

## 2022-10-25 PROCEDURE — 3075F SYST BP GE 130 - 139MM HG: CPT | Mod: CPTII,S$GLB,, | Performed by: PAIN MEDICINE

## 2022-10-25 PROCEDURE — 99204 OFFICE O/P NEW MOD 45 MIN: CPT | Mod: 25,S$GLB,, | Performed by: PAIN MEDICINE

## 2022-10-25 PROCEDURE — 1159F PR MEDICATION LIST DOCUMENTED IN MEDICAL RECORD: ICD-10-PCS | Mod: CPTII,S$GLB,, | Performed by: PAIN MEDICINE

## 2022-10-25 PROCEDURE — 3075F PR MOST RECENT SYSTOLIC BLOOD PRESS GE 130-139MM HG: ICD-10-PCS | Mod: CPTII,S$GLB,, | Performed by: PAIN MEDICINE

## 2022-10-25 PROCEDURE — 99999 PR PBB SHADOW E&M-EST. PATIENT-LVL V: ICD-10-PCS | Mod: PBBFAC,,, | Performed by: PAIN MEDICINE

## 2022-10-25 PROCEDURE — 1160F PR REVIEW ALL MEDS BY PRESCRIBER/CLIN PHARMACIST DOCUMENTED: ICD-10-PCS | Mod: CPTII,S$GLB,, | Performed by: PAIN MEDICINE

## 2022-10-25 PROCEDURE — 3079F DIAST BP 80-89 MM HG: CPT | Mod: CPTII,S$GLB,, | Performed by: PAIN MEDICINE

## 2022-10-25 PROCEDURE — 1159F MED LIST DOCD IN RCRD: CPT | Mod: CPTII,S$GLB,, | Performed by: PAIN MEDICINE

## 2022-10-25 RX ORDER — TRIAMCINOLONE ACETONIDE 40 MG/ML
40 INJECTION, SUSPENSION INTRA-ARTICULAR; INTRAMUSCULAR
Status: COMPLETED | OUTPATIENT
Start: 2022-10-25 | End: 2022-10-25

## 2022-10-25 RX ORDER — ESOMEPRAZOLE MAGNESIUM 40 MG/1
1 CAPSULE, DELAYED RELEASE ORAL 2 TIMES DAILY
COMMUNITY
Start: 2022-01-31

## 2022-10-25 RX ADMIN — TRIAMCINOLONE ACETONIDE 40 MG: 40 INJECTION, SUSPENSION INTRA-ARTICULAR; INTRAMUSCULAR at 02:10

## 2022-10-25 NOTE — PROGRESS NOTES
Subjective:     Patient ID: Susana Presley is a 42 y.o. female    Chief Complaint: Hip Pain (Right side)      Referred by: Self, Aaareferral      HPI:    Initial Encounter (10/25/22):  Susana Presley is a 42 y.o. female who presents today with chronic right-sided hip, knee, hand pain.  Has chronic knee pain associated with swelling and has had her knee drained multiple times.  Has not had right hand evaluated.  Right hip pain is located over the right greater trochanter.  This pain has been present for many months.  Pain is particularly bad when trying to sleep on her right side.  She denies any radiating pain.  She denies any associated numbness, tingling, weakness, bowel bladder dysfunction. This pain is described in detail below.    Physical Therapy:  No.    Non-pharmacologic Treatment:  Rest helps         TENS?  No    Pain Medications:         Currently taking:  Tizanidine, prednisone, ibuprofen, naproxen    Has tried in the past:  NSAIDs, Tylenol, muscle relaxants    Has not tried:  Opioids, TCAs, SNRIs, anticonvulsants, topical creams    Blood thinners:  None    Interventional Therapies:  None    Relevant Surgeries:  None    Affecting sleep?  Yes    Affecting daily activities? yes    Depressive symptoms? no          SI/HI? No    Work status: Employed    Pain Scores:    Best:       5/10  Worst:     10/10  Usually:   10/10  Today:    7/10    Pain Disability Index  Family/Home Responsibilities:: 10  Recreation:: 7  Social Activity:: 7  Occupation:: 7  Sexual Behavior:: 7  Self Care:: 5  Life-Support Activities:: 2  Pain Disability Index (PDI): 45    Review of Systems   Constitutional:  Negative for activity change, appetite change, chills, fatigue, fever and unexpected weight change.   HENT:  Negative for hearing loss.    Eyes:  Negative for visual disturbance.   Respiratory:  Negative for chest tightness and shortness of breath.    Cardiovascular:  Negative for chest pain.   Gastrointestinal:  Negative for abdominal  pain, constipation, diarrhea, nausea and vomiting.   Genitourinary:  Negative for difficulty urinating.   Musculoskeletal:  Positive for arthralgias, joint swelling and myalgias. Negative for back pain, gait problem and neck pain.   Skin:  Negative for rash.   Neurological:  Negative for dizziness, weakness, light-headedness, numbness and headaches.   Psychiatric/Behavioral:  Positive for sleep disturbance. Negative for hallucinations and suicidal ideas. The patient is not nervous/anxious.      Past Medical History:   Diagnosis Date    Anxiety     Headache        Past Surgical History:   Procedure Laterality Date    TONSILLECTOMY      TUBAL LIGATION         Social History     Socioeconomic History    Marital status:    Tobacco Use    Smoking status: Former     Types: Cigarettes     Quit date:      Years since quittin.8    Smokeless tobacco: Never   Substance and Sexual Activity    Alcohol use: Yes     Comment: rarely       Review of patient's allergies indicates:  No Known Allergies    Current Outpatient Medications on File Prior to Visit   Medication Sig Dispense Refill    albuterol (VENTOLIN HFA) 90 mcg/actuation inhaler Inhale 2 puffs into the lungs every 6 (six) hours as needed for Wheezing. Rescue 18 g 0    busPIRone (BUSPAR) 30 MG Tab Take 30 mg by mouth 3 (three) times daily.      cetirizine (ZYRTEC) 10 MG tablet Take 1 tablet (10 mg total) by mouth once daily. 30 tablet 0    clonazepam (KLONOPIN ORAL) Take by mouth.      esomeprazole (NEXIUM) 40 MG capsule Take 1 capsule by mouth 2 (two) times daily.      FLUoxetine 40 MG capsule Take 40 mg by mouth once daily.      fluticasone propionate (FLONASE) 50 mcg/actuation nasal spray 1 spray (50 mcg total) by Each Nostril route once daily. 15.8 mL 0    ibuprofen (ADVIL,MOTRIN) 200 MG tablet Take 200 mg by mouth every 6 (six) hours as needed.      ipratropium (ATROVENT) 21 mcg (0.03 %) nasal spray 2 sprays by Nasal route 3 (three) times daily. As  "needed for clear runny nose 30 mL 2    medroxyPROGESTERone (DEPO-PROVERA) 150 mg/mL injection Inject 150 mg into the muscle every 3 (three) months.      naproxen (NAPROSYN) 500 MG tablet Take 1 tablet by mouth 2 (two) times daily with meals.      pantoprazole (PROTONIX) 40 MG tablet Take 1 tablet by mouth Daily.      predniSONE (DELTASONE) 20 MG tablet On full stomach (breakfast): 3 tabs for 2 days, 2 tabs for 2 days, 1 tab for 2 days. Finish 12 tablet 0    promethazine HCl (PHENERGAN ORAL) Take 1 tablet by mouth once daily.      pseudoephedrine-guaiFENesin  mg (MUCINEX D)  mg per tablet Take 1 tablet by mouth.      rizatriptan (MAXALT-MLT) 10 MG disintegrating tablet 1 melt at onset headache, second melt 2 hours later if needed, no more than 2 melts day/ 3days 10 tablet 5    tiZANidine (ZANAFLEX) 2 MG tablet 1-2 pills approx 1-2 hours before bed, as needed for neck pain and headaches 60 tablet 4    zolpidem (AMBIEN CR) 12.5 MG CR tablet SMARTSI Tablet(s) By Mouth Every Evening PRN       No current facility-administered medications on file prior to visit.       Objective:      /85 (BP Location: Left arm, Patient Position: Sitting, BP Method: Large (Automatic))   Pulse 99   Resp 16   Ht 5' 3" (1.6 m)   Wt 91.8 kg (202 lb 7.9 oz)   SpO2 98%   BMI 35.87 kg/m²     Exam:  GEN:  Well developed, well nourished.  No acute distress.   HEENT:  No trauma.  Mucous membranes moist.  Nares patent bilaterally.  PSYCH: Normal affect. Thought content appropriate.  CHEST:  Breathing symmetric.  No audible wheezing.  ABD: Soft, non-distended.  SKIN:  Warm, pink, dry.  No rash on exposed areas.    EXT:  No cyanosis, clubbing, or edema.  No color change or changes in nail or hair growth.  NEURO/MUSCULOSKELETAL:  Fully alert, oriented, and appropriate. Speech normal vivian. No cranial nerve deficits.   Gait:  Normal.  No focal motor deficits.     Tender to palpation over the right greater trochanter with full " reproduction of right hip pain.      Imaging:  No pertinent imaging    Assessment:       Encounter Diagnoses   Name Primary?    Right hand pain     Chronic pain of right knee     Trochanteric bursitis, right hip          Plan:       Susana was seen today for hip pain.    Diagnoses and all orders for this visit:    Right hand pain  -     Ambulatory referral/consult to Orthopedics; Future    Chronic pain of right knee  -     Ambulatory referral/consult to Orthopedics; Future    Trochanteric bursitis, right hip  -     triamcinolone acetonide injection 40 mg      Susana Presley is a 42 y.o. female with chronic right lateral hip pain.  Likely related to trochanteric bursitis.  Also with chronic right knee and right hand pain.    Right greater trochanteric bursa steroid injection done today.  Refer to Orthopedic surgery for further evaluation of right knee and hand pain.  Return to clinic in 6 weeks or sooner if needed.  At that time we will discuss efficacy of trochanteric bursa steroid injection.  May consider physical therapy referral for ITB stretching if pain persists or worsens.      right Greater Trochanteric bursa Steroid Injection     Risks vs. Benefits were discussed with patient. Patient expressed understanding and written consent was obtained. The right greater trochanter was palpated and marked.  The lateral hip(s) was/were draped and prepped with chrolaprep. A 25 gauge 1.5 inch needle was advanced to the right greater trochanter. After negative aspiration, 40 mg of Kenalog and 2.0 cc of 1% lidocaine were injected.  The patient tolerated the procedure well, without any complications or bleeding. She was discharged from clinic in good condition.         This note was created by combination of typed  and M-Modal dictation. Transcription and phonetic errors may be present.  If there are any questions, please contact me.

## 2022-10-26 ENCOUNTER — TELEPHONE (OUTPATIENT)
Dept: ORTHOPEDICS | Facility: CLINIC | Age: 42
End: 2022-10-26
Payer: COMMERCIAL

## 2022-10-26 DIAGNOSIS — J34.2 NASAL SEPTAL DEVIATION: Primary | ICD-10-CM

## 2022-10-26 DIAGNOSIS — M25.561 RIGHT KNEE PAIN, UNSPECIFIED CHRONICITY: Primary | ICD-10-CM

## 2022-10-31 ENCOUNTER — OFFICE VISIT (OUTPATIENT)
Dept: ORTHOPEDICS | Facility: CLINIC | Age: 42
End: 2022-10-31
Payer: COMMERCIAL

## 2022-10-31 VITALS
BODY MASS INDEX: 35.6 KG/M2 | HEIGHT: 63 IN | SYSTOLIC BLOOD PRESSURE: 129 MMHG | HEART RATE: 74 BPM | WEIGHT: 200.94 LBS | DIASTOLIC BLOOD PRESSURE: 87 MMHG

## 2022-10-31 DIAGNOSIS — M25.561 CHRONIC PAIN OF RIGHT KNEE: ICD-10-CM

## 2022-10-31 DIAGNOSIS — G89.29 CHRONIC PAIN OF RIGHT KNEE: ICD-10-CM

## 2022-10-31 DIAGNOSIS — M70.61 TROCHANTERIC BURSITIS, RIGHT HIP: ICD-10-CM

## 2022-10-31 PROCEDURE — 3079F PR MOST RECENT DIASTOLIC BLOOD PRESSURE 80-89 MM HG: ICD-10-PCS | Mod: CPTII,S$GLB,, | Performed by: ORTHOPAEDIC SURGERY

## 2022-10-31 PROCEDURE — 3074F PR MOST RECENT SYSTOLIC BLOOD PRESSURE < 130 MM HG: ICD-10-PCS | Mod: CPTII,S$GLB,, | Performed by: ORTHOPAEDIC SURGERY

## 2022-10-31 PROCEDURE — 3074F SYST BP LT 130 MM HG: CPT | Mod: CPTII,S$GLB,, | Performed by: ORTHOPAEDIC SURGERY

## 2022-10-31 PROCEDURE — 1159F PR MEDICATION LIST DOCUMENTED IN MEDICAL RECORD: ICD-10-PCS | Mod: CPTII,S$GLB,, | Performed by: ORTHOPAEDIC SURGERY

## 2022-10-31 PROCEDURE — 99203 OFFICE O/P NEW LOW 30 MIN: CPT | Mod: 25,S$GLB,, | Performed by: ORTHOPAEDIC SURGERY

## 2022-10-31 PROCEDURE — 1159F MED LIST DOCD IN RCRD: CPT | Mod: CPTII,S$GLB,, | Performed by: ORTHOPAEDIC SURGERY

## 2022-10-31 PROCEDURE — 99999 PR PBB SHADOW E&M-EST. PATIENT-LVL V: CPT | Mod: PBBFAC,,, | Performed by: ORTHOPAEDIC SURGERY

## 2022-10-31 PROCEDURE — 3079F DIAST BP 80-89 MM HG: CPT | Mod: CPTII,S$GLB,, | Performed by: ORTHOPAEDIC SURGERY

## 2022-10-31 PROCEDURE — 20610 DRAIN/INJ JOINT/BURSA W/O US: CPT | Mod: RT,S$GLB,, | Performed by: ORTHOPAEDIC SURGERY

## 2022-10-31 PROCEDURE — 99203 PR OFFICE/OUTPT VISIT, NEW, LEVL III, 30-44 MIN: ICD-10-PCS | Mod: 25,S$GLB,, | Performed by: ORTHOPAEDIC SURGERY

## 2022-10-31 PROCEDURE — 99999 PR PBB SHADOW E&M-EST. PATIENT-LVL V: ICD-10-PCS | Mod: PBBFAC,,, | Performed by: ORTHOPAEDIC SURGERY

## 2022-10-31 PROCEDURE — 20610 LARGE JOINT ASPIRATION/INJECTION: R KNEE: ICD-10-PCS | Mod: RT,S$GLB,, | Performed by: ORTHOPAEDIC SURGERY

## 2022-10-31 RX ORDER — TRIAMCINOLONE ACETONIDE 40 MG/ML
40 INJECTION, SUSPENSION INTRA-ARTICULAR; INTRAMUSCULAR
Status: DISCONTINUED | OUTPATIENT
Start: 2022-10-31 | End: 2022-10-31 | Stop reason: HOSPADM

## 2022-10-31 RX ADMIN — TRIAMCINOLONE ACETONIDE 40 MG: 40 INJECTION, SUSPENSION INTRA-ARTICULAR; INTRAMUSCULAR at 09:10

## 2022-10-31 NOTE — PROGRESS NOTES
Patient ID: Susana Presley is a 42 y.o. female    Chief Complaint:   Chief Complaint   Patient presents with    Right Knee - Pain       History of Present Illness:    Susana Presley is a pleasant 42 y.o. female who presents for evaluation of right hip and knee pain. She  reports pain for the past few weeks. She does endorse a history of trauma.  Reports an injury several years ago in  were she states she had to have her knee drained a few times.  Crossing her legs and straightening her knee makes it worse and rest makes it better. The pain is mostly lateral in the hip and knee. She does endorse nighttime pain.   She is independent with all activities of daily living.     In the past she has tried the following treatments:    Cortisone injection right trochanteric bursa recently with some relief        Instability: No  Mechanical Symptoms: No    Diabetic:  No  Smoker:  No          PAST MEDICAL HISTORY:   Past Medical History:   Diagnosis Date    Anxiety     Headache      PAST SURGICAL HISTORY:   Past Surgical History:   Procedure Laterality Date    TONSILLECTOMY      TUBAL LIGATION       FAMILY HISTORY:   Family History   Problem Relation Age of Onset    Diabetes Mother     Hypertension Mother     Hyperlipidemia Father      SOCIAL HISTORY:   Social History     Occupational History    Not on file   Tobacco Use    Smoking status: Former     Types: Cigarettes     Quit date:      Years since quittin.8    Smokeless tobacco: Never   Substance and Sexual Activity    Alcohol use: Yes     Comment: rarely    Drug use: Not on file    Sexual activity: Not on file        MEDICATIONS:   Current Outpatient Medications:     albuterol (VENTOLIN HFA) 90 mcg/actuation inhaler, Inhale 2 puffs into the lungs every 6 (six) hours as needed for Wheezing. Rescue, Disp: 18 g, Rfl: 0    busPIRone (BUSPAR) 30 MG Tab, Take 30 mg by mouth 3 (three) times daily., Disp: , Rfl:     clonazepam (KLONOPIN ORAL), Take by mouth., Disp: , Rfl:      esomeprazole (NEXIUM) 40 MG capsule, Take 1 capsule by mouth 2 (two) times daily., Disp: , Rfl:     FLUoxetine 40 MG capsule, Take 40 mg by mouth once daily., Disp: , Rfl:     fluticasone propionate (FLONASE) 50 mcg/actuation nasal spray, 1 spray (50 mcg total) by Each Nostril route once daily., Disp: 15.8 mL, Rfl: 0    ibuprofen (ADVIL,MOTRIN) 200 MG tablet, Take 200 mg by mouth every 6 (six) hours as needed., Disp: , Rfl:     ipratropium (ATROVENT) 21 mcg (0.03 %) nasal spray, 2 sprays by Nasal route 3 (three) times daily. As needed for clear runny nose, Disp: 30 mL, Rfl: 2    medroxyPROGESTERone (DEPO-PROVERA) 150 mg/mL injection, Inject 150 mg into the muscle every 3 (three) months., Disp: , Rfl:     naproxen (NAPROSYN) 500 MG tablet, Take 1 tablet by mouth 2 (two) times daily with meals., Disp: , Rfl:     pantoprazole (PROTONIX) 40 MG tablet, Take 1 tablet by mouth Daily., Disp: , Rfl:     predniSONE (DELTASONE) 20 MG tablet, On full stomach (breakfast): 3 tabs for 2 days, 2 tabs for 2 days, 1 tab for 2 days. Finish, Disp: 12 tablet, Rfl: 0    promethazine HCl (PHENERGAN ORAL), Take 1 tablet by mouth once daily., Disp: , Rfl:     pseudoephedrine-guaiFENesin  mg (MUCINEX D)  mg per tablet, Take 1 tablet by mouth., Disp: , Rfl:     rizatriptan (MAXALT-MLT) 10 MG disintegrating tablet, 1 melt at onset headache, second melt 2 hours later if needed, no more than 2 melts day/ 3days, Disp: 10 tablet, Rfl: 5    tiZANidine (ZANAFLEX) 2 MG tablet, 1-2 pills approx 1-2 hours before bed, as needed for neck pain and headaches, Disp: 60 tablet, Rfl: 4    zolpidem (AMBIEN CR) 12.5 MG CR tablet, SMARTSI Tablet(s) By Mouth Every Evening PRN, Disp: , Rfl:     cetirizine (ZYRTEC) 10 MG tablet, Take 1 tablet (10 mg total) by mouth once daily., Disp: 30 tablet, Rfl: 0  ALLERGIES: Review of patient's allergies indicates:  No Known Allergies      Physical Exam     Vitals:    10/31/22 0849   BP: 129/87   Pulse: 74      Alert and oriented to person, place and time. No acute distress. Well-groomed, not ill appearing. Pupils round and reactive, normal respiratory effort, no audible wheezing.     OBSERVATION / INSPECTION   Gait:   Nonantalgic   Alignment:  Neutral   Scars:   None   Muscle atrophy: None  Effusion:  None   Warmth:  None   Discoloration:   None     TENDERNESS                 Patella     Negative    Peripatellar medial    Negative   Peripatellar lateral   Negative   Patellar tendon   Negative   Quad tendon     Negative    Prepatellar Bursa   Negative     Tibial tubercle    Negative    Pes anserine/HS   Negative      ITB     Negative      LCL     Negative  MFC     Negative  LFC     Negative  MCL      Negative  Medial Joint Line    Negative   Lateral Joint Line   Negative  Quadriceps    Negative  Hamstring     Negative  Tender over the Gerdy's tubercle   Tender over right trochanteric bursa and greater trochanter           Range of  Motion:      Right :    0-140°      Patellofemoral examination:     Patella position    Centered  Crepitus (PF):    Absent   Lateral tilt:    Normal   J-sign:     None   Patellofemoral grind:   No pain       MENISCAL SIGNS:     Pain on terminal extension:  Negative  Pain on terminal flexion:  Negative  Christos maneuver:  Negative     LIGAMENT EXAMINATION:  ACL / Lachman:  normal   PCL-Post.  drawer: normal 0 to 2mm  MCL- Valgus:  normal 0 to 2mm  LCL- Varus:    normal 0 to 2mm        EXTREMITY NEURO-VASCULAR EXAMINATION:   Sensation:  Grossly intact to light touch all dermatomal regions.   Motor Function:  Fully intact motor function at hip, knee, foot and ankle    DTRs;  quadriceps and  achilles 2+.  No clonus and downgoing Babinski.    Vascular status:  DP and PT pulses 2+, brisk capillary refill, symmetric.          Imaging:     X-Ray: I have reviewed all pertinent results/findings and my personal findings are:  No significant degenerative changes of the right knee.  No evidence of  loose body or soft tissue swelling or effusion.      Assessment & Plan    Trochanteric bursitis, right hip  -     Ambulatory referral/consult to Orthopedics    Chronic pain of right knee  -     Ambulatory referral/consult to Orthopedics       I made the decision to obtain old records of the patient including previous notes and imaging. New imaging, if ordered today of the extremity or extremities, were evaluated. I independently reviewed and interpreted the radiographs and/or MRIs/CT scan today as well as prior imaging.    I believe that this is likely trochanteric bursitis and ITB band syndrome    We discussed at length different treatment options including anti-inflammatories, acetaminophen, rest, ice, heat, formal physical therapy including strengthening and stretching exercises, home exercise programs, injections, dry needling, and finally surgical intervention if conservative treatment fails.       Patient would like to proceed with a trial of:     Corticosteroid Injection right knee -- patient requesting right knee injection however I do think most of her pain is coming from her right hip  Formal Physical Therapy  Home Exercise program      Consider MRI if pain is refractory to conservative treatment    All questions were answered and patient is agreeable to the above plan.           Follow up: as needed  X-rays next visit:  None

## 2022-10-31 NOTE — PROCEDURES
Large Joint Aspiration/Injection: R knee    Date/Time: 10/31/2022 9:00 AM  Performed by: Bill Faria MD  Authorized by: Bill Faria MD     Consent Done?:  Yes (Verbal)  Indications:  Pain  Site marked: the procedure site was marked    Timeout: prior to procedure the correct patient, procedure, and site was verified    Local anesthetic:  Lidocaine 1% without epinephrine  Anesthetic total (ml):  3      Details:  Needle Size:  22 G  Approach:  Anterolateral  Location:  Knee  Site:  R knee  Medications:  40 mg triamcinolone acetonide 40 mg/mL  Patient tolerance:  Patient tolerated the procedure well with no immediate complications

## 2022-11-01 ENCOUNTER — PATIENT MESSAGE (OUTPATIENT)
Dept: NEUROLOGY | Facility: CLINIC | Age: 42
End: 2022-11-01
Payer: COMMERCIAL

## 2022-11-01 DIAGNOSIS — G44.40 REBOUND HEADACHE: ICD-10-CM

## 2022-11-01 DIAGNOSIS — G43.009 MIGRAINE WITHOUT AURA AND WITHOUT STATUS MIGRAINOSUS, NOT INTRACTABLE: Primary | ICD-10-CM

## 2022-11-01 RX ORDER — RIMEGEPANT SULFATE 75 MG/75MG
75 TABLET, ORALLY DISINTEGRATING ORAL ONCE AS NEEDED
Qty: 8 TABLET | Refills: 8 | Status: SHIPPED | OUTPATIENT
Start: 2022-11-01 | End: 2022-11-01

## 2022-11-02 ENCOUNTER — TELEPHONE (OUTPATIENT)
Dept: PHARMACY | Facility: CLINIC | Age: 42
End: 2022-11-02
Payer: COMMERCIAL

## 2022-11-03 ENCOUNTER — PATIENT MESSAGE (OUTPATIENT)
Dept: NEUROLOGY | Facility: CLINIC | Age: 42
End: 2022-11-03
Payer: COMMERCIAL

## 2022-11-03 RX ORDER — SUMATRIPTAN SUCCINATE 100 MG/1
TABLET ORAL
Qty: 12 TABLET | Refills: 4 | Status: SHIPPED | OUTPATIENT
Start: 2022-11-03

## 2023-05-16 RX ORDER — TIZANIDINE 2 MG/1
TABLET ORAL
Qty: 60 TABLET | Refills: 0 | OUTPATIENT
Start: 2023-05-16

## 2023-11-27 ENCOUNTER — LAB VISIT (OUTPATIENT)
Dept: LAB | Facility: HOSPITAL | Age: 43
End: 2023-11-27
Attending: EMERGENCY MEDICINE
Payer: COMMERCIAL

## 2023-11-27 DIAGNOSIS — Z13.21 SCREENING FOR MALNUTRITION: Primary | ICD-10-CM

## 2023-11-27 LAB
ALBUMIN SERPL BCP-MCNC: 4.4 G/DL (ref 3.5–5.2)
ALP SERPL-CCNC: 41 U/L (ref 55–135)
ALT SERPL W/O P-5'-P-CCNC: 52 U/L (ref 10–44)
ANION GAP SERPL CALC-SCNC: 6 MMOL/L (ref 8–16)
AST SERPL-CCNC: 23 U/L (ref 10–40)
BASOPHILS # BLD AUTO: 0.04 K/UL (ref 0–0.2)
BASOPHILS NFR BLD: 0.4 % (ref 0–1.9)
BILIRUB SERPL-MCNC: 0.4 MG/DL (ref 0.1–1)
BUN SERPL-MCNC: 15 MG/DL (ref 6–20)
CALCIUM SERPL-MCNC: 9.9 MG/DL (ref 8.7–10.5)
CHLORIDE SERPL-SCNC: 104 MMOL/L (ref 95–110)
CO2 SERPL-SCNC: 27 MMOL/L (ref 23–29)
CREAT SERPL-MCNC: 0.7 MG/DL (ref 0.5–1.4)
DIFFERENTIAL METHOD BLD: ABNORMAL
EOSINOPHIL # BLD AUTO: 0.3 K/UL (ref 0–0.5)
EOSINOPHIL NFR BLD: 2.6 % (ref 0–8)
ERYTHROCYTE [DISTWIDTH] IN BLOOD BY AUTOMATED COUNT: 12.1 % (ref 11.5–14.5)
EST. GFR  (NO RACE VARIABLE): >60 ML/MIN/1.73 M^2
GLUCOSE SERPL-MCNC: 155 MG/DL (ref 70–110)
HCT VFR BLD AUTO: 42.8 % (ref 37–48.5)
HGB BLD-MCNC: 14.5 G/DL (ref 12–16)
IMM GRANULOCYTES # BLD AUTO: 0.06 K/UL (ref 0–0.04)
IMM GRANULOCYTES NFR BLD AUTO: 0.6 % (ref 0–0.5)
LYMPHOCYTES # BLD AUTO: 3.9 K/UL (ref 1–4.8)
LYMPHOCYTES NFR BLD: 39.3 % (ref 18–48)
MCH RBC QN AUTO: 30.9 PG (ref 27–31)
MCHC RBC AUTO-ENTMCNC: 33.9 G/DL (ref 32–36)
MCV RBC AUTO: 91 FL (ref 82–98)
MONOCYTES # BLD AUTO: 0.4 K/UL (ref 0.3–1)
MONOCYTES NFR BLD: 4.5 % (ref 4–15)
NEUTROPHILS # BLD AUTO: 5.2 K/UL (ref 1.8–7.7)
NEUTROPHILS NFR BLD: 52.6 % (ref 38–73)
NRBC BLD-RTO: 0 /100 WBC
PLATELET # BLD AUTO: 216 K/UL (ref 150–450)
PMV BLD AUTO: 9.8 FL (ref 9.2–12.9)
POTASSIUM SERPL-SCNC: 4.2 MMOL/L (ref 3.5–5.1)
PROT SERPL-MCNC: 7.3 G/DL (ref 6–8.4)
RBC # BLD AUTO: 4.7 M/UL (ref 4–5.4)
SODIUM SERPL-SCNC: 137 MMOL/L (ref 136–145)
WBC # BLD AUTO: 9.84 K/UL (ref 3.9–12.7)

## 2023-11-27 PROCEDURE — 80053 COMPREHEN METABOLIC PANEL: CPT | Performed by: EMERGENCY MEDICINE

## 2023-11-27 PROCEDURE — 36415 COLL VENOUS BLD VENIPUNCTURE: CPT | Performed by: EMERGENCY MEDICINE

## 2023-11-27 PROCEDURE — 85025 COMPLETE CBC W/AUTO DIFF WBC: CPT | Performed by: EMERGENCY MEDICINE

## 2024-01-09 DIAGNOSIS — Z12.31 ENCOUNTER FOR SCREENING MAMMOGRAM FOR MALIGNANT NEOPLASM OF BREAST: Primary | ICD-10-CM

## 2024-01-28 ENCOUNTER — OFFICE VISIT (OUTPATIENT)
Dept: URGENT CARE | Facility: CLINIC | Age: 44
End: 2024-01-28
Payer: COMMERCIAL

## 2024-01-28 VITALS
WEIGHT: 193 LBS | HEART RATE: 92 BPM | TEMPERATURE: 98 F | OXYGEN SATURATION: 100 % | SYSTOLIC BLOOD PRESSURE: 136 MMHG | BODY MASS INDEX: 34.2 KG/M2 | DIASTOLIC BLOOD PRESSURE: 89 MMHG | RESPIRATION RATE: 18 BRPM | HEIGHT: 63 IN

## 2024-01-28 DIAGNOSIS — Z20.822 COVID-19 VIRUS NOT DETECTED: ICD-10-CM

## 2024-01-28 DIAGNOSIS — R89.4 INFLUENZA A VIRUS NOT DETECTED: ICD-10-CM

## 2024-01-28 DIAGNOSIS — J06.9 VIRAL URI WITH COUGH: Primary | ICD-10-CM

## 2024-01-28 DIAGNOSIS — J02.9 SORE THROAT: ICD-10-CM

## 2024-01-28 LAB
CTP QC/QA: YES
FLUAV AG NPH QL: NEGATIVE
FLUBV AG NPH QL: NEGATIVE
S PYO RRNA THROAT QL PROBE: NEGATIVE
SARS-COV-2 AG RESP QL IA.RAPID: NEGATIVE

## 2024-01-28 PROCEDURE — 87804 INFLUENZA ASSAY W/OPTIC: CPT | Mod: QW,,,

## 2024-01-28 PROCEDURE — 87811 SARS-COV-2 COVID19 W/OPTIC: CPT | Mod: QW,S$GLB,,

## 2024-01-28 PROCEDURE — 87880 STREP A ASSAY W/OPTIC: CPT | Mod: QW,,,

## 2024-01-28 PROCEDURE — 99214 OFFICE O/P EST MOD 30 MIN: CPT | Mod: S$GLB,,,

## 2024-01-28 RX ORDER — BENZONATATE 100 MG/1
100 CAPSULE ORAL 3 TIMES DAILY PRN
Qty: 30 CAPSULE | Refills: 0 | Status: SHIPPED | OUTPATIENT
Start: 2024-01-28 | End: 2024-02-07

## 2024-01-28 RX ORDER — BROMPHENIRAMINE MALEATE, PSEUDOEPHEDRINE HYDROCHLORIDE, AND DEXTROMETHORPHAN HYDROBROMIDE 2; 30; 10 MG/5ML; MG/5ML; MG/5ML
10 SYRUP ORAL 4 TIMES DAILY PRN
Qty: 118 ML | Refills: 0 | Status: SHIPPED | OUTPATIENT
Start: 2024-01-28 | End: 2024-02-07

## 2024-01-28 RX ORDER — PROMETHAZINE HYDROCHLORIDE AND DEXTROMETHORPHAN HYDROBROMIDE 6.25; 15 MG/5ML; MG/5ML
5 SYRUP ORAL NIGHTLY PRN
Qty: 50 ML | Refills: 0 | Status: SHIPPED | OUTPATIENT
Start: 2024-01-28 | End: 2024-02-07

## 2024-01-28 NOTE — LETTER
January 28, 2024      [facility]  [address]  [facility] [address]  Phone: [phone]       Patient: Susana Presley   YOB: 1980  Date of Visit: 01/28/2024    To Whom It May Concern:    Becky Presley  was at [facility] on 01/28/2024. The patient may return to work/school on 1/30/24 with no restrictions. If you have any questions or concerns, or if I can be of further assistance, please do not hesitate to contact me.    Sincerely,    EDILBERTO Lauren

## 2024-01-28 NOTE — PROGRESS NOTES
"Subjective:      Patient ID: Susana Presley is a 43 y.o. female.    Vitals:  height is 5' 3" (1.6 m) and weight is 87.5 kg (193 lb). Her temperature is 98.1 °F (36.7 °C). Her blood pressure is 136/89 and her pulse is 92. Her respiration is 18 and oxygen saturation is 100%.     Chief Complaint: Cough    Three days of headache, bilateral earaches, congestion, sore throat, productive cough, nausea, and body aches.  Co-worker recently influenza positive, and then strep positive.    Cough  This is a new problem. The current episode started in the past 7 days. The cough is Productive of sputum. Associated symptoms include chills, ear pain, headaches, myalgias, nasal congestion, postnasal drip, rhinorrhea and a sore throat. She has tried OTC cough suppressant for the symptoms.       Constitution: Positive for chills.   HENT:  Positive for ear pain, congestion, postnasal drip and sore throat.    Neck: neck negative.   Cardiovascular: Negative.    Eyes: Negative.    Respiratory:  Positive for cough.    Gastrointestinal:  Positive for diarrhea.   Endocrine: negative.   Genitourinary: Negative.    Musculoskeletal:  Positive for muscle ache.   Skin: Negative.    Allergic/Immunologic: Positive for immunizations up-to-date. Negative for flu shot.   Neurological:  Positive for headaches.   Hematologic/Lymphatic: Negative.    Psychiatric/Behavioral: Negative.        Objective:     Physical Exam   Constitutional: She is oriented to person, place, and time. She appears well-developed. She is cooperative.   HENT:   Head: Normocephalic and atraumatic.   Ears:   Right Ear: Hearing, tympanic membrane, external ear and ear canal normal.   Left Ear: Hearing, tympanic membrane, external ear and ear canal normal.   Nose: Nose normal. No mucosal edema or nasal deformity. No epistaxis. Right sinus exhibits no maxillary sinus tenderness and no frontal sinus tenderness. Left sinus exhibits no maxillary sinus tenderness and no frontal sinus " tenderness.   Mouth/Throat: Uvula is midline, oropharynx is clear and moist and mucous membranes are normal. Mucous membranes are moist. No trismus in the jaw. Normal dentition. No uvula swelling. No posterior oropharyngeal erythema. Oropharynx is clear.   Eyes: Conjunctivae and lids are normal. Pupils are equal, round, and reactive to light. Extraocular movement intact   Neck: Trachea normal and phonation normal. Neck supple.   Cardiovascular: Normal rate, regular rhythm, normal heart sounds and normal pulses.   Pulmonary/Chest: Effort normal and breath sounds normal.   Abdominal: Normal appearance. Soft. flat abdomen There is no abdominal tenderness.   Musculoskeletal: Normal range of motion.         General: Normal range of motion.   Neurological: no focal deficit. She is alert, oriented to person, place, and time and at baseline. She exhibits normal muscle tone.   Skin: Skin is warm, dry and intact. Capillary refill takes 2 to 3 seconds.   Psychiatric: Her speech is normal and behavior is normal. Mood, judgment and thought content normal.   Nursing note and vitals reviewed.      Assessment:     1. Viral URI with cough    2. Sore throat        Plan:       Viral URI with cough  -     benzonatate (TESSALON) 100 MG capsule; Take 1 capsule (100 mg total) by mouth 3 (three) times daily as needed for Cough.  Dispense: 30 capsule; Refill: 0  -     brompheniramine-pseudoeph-DM (BROMFED DM) 2-30-10 mg/5 mL Syrp; Take 10 mLs by mouth 4 (four) times daily as needed (Cough).  Dispense: 118 mL; Refill: 0  -     promethazine-dextromethorphan (PROMETHAZINE-DM) 6.25-15 mg/5 mL Syrp; Take 5 mLs by mouth nightly as needed (night time cough).  Dispense: 50 mL; Refill: 0    Sore throat  -     POCT Influenza A/B Rapid Antigen  -     POCT rapid strep A  -     SARS Coronavirus 2 Antigen, POCT Manual Read

## 2024-09-09 DIAGNOSIS — M25.522 PAIN IN LEFT ELBOW: Primary | ICD-10-CM

## 2024-12-23 ENCOUNTER — OFFICE VISIT (OUTPATIENT)
Dept: URGENT CARE | Facility: CLINIC | Age: 44
End: 2024-12-23
Payer: COMMERCIAL

## 2024-12-23 VITALS
RESPIRATION RATE: 18 BRPM | HEIGHT: 63 IN | TEMPERATURE: 99 F | DIASTOLIC BLOOD PRESSURE: 97 MMHG | BODY MASS INDEX: 31.89 KG/M2 | WEIGHT: 180 LBS | HEART RATE: 124 BPM | OXYGEN SATURATION: 97 % | SYSTOLIC BLOOD PRESSURE: 154 MMHG

## 2024-12-23 DIAGNOSIS — J02.9 SORE THROAT: ICD-10-CM

## 2024-12-23 DIAGNOSIS — H66.91 RIGHT OTITIS MEDIA, UNSPECIFIED OTITIS MEDIA TYPE: Primary | ICD-10-CM

## 2024-12-23 LAB
CTP QC/QA: YES
CTP QC/QA: YES
FLUAV AG NPH QL: NEGATIVE
FLUBV AG NPH QL: NEGATIVE
S PYO RRNA THROAT QL PROBE: NEGATIVE

## 2024-12-23 PROCEDURE — 87880 STREP A ASSAY W/OPTIC: CPT | Mod: QW,,, | Performed by: NURSE PRACTITIONER

## 2024-12-23 PROCEDURE — 99214 OFFICE O/P EST MOD 30 MIN: CPT | Mod: S$GLB,,, | Performed by: NURSE PRACTITIONER

## 2024-12-23 PROCEDURE — 87804 INFLUENZA ASSAY W/OPTIC: CPT | Mod: QW,,, | Performed by: NURSE PRACTITIONER

## 2024-12-23 RX ORDER — AMOXICILLIN AND CLAVULANATE POTASSIUM 875; 125 MG/1; MG/1
1 TABLET, FILM COATED ORAL EVERY 12 HOURS
Qty: 14 TABLET | Refills: 0 | Status: SHIPPED | OUTPATIENT
Start: 2024-12-23 | End: 2024-12-30

## 2024-12-23 NOTE — PATIENT INSTRUCTIONS
EAR INFECTION  Take full course of antibiotics as prescribed.  Warm compresses to affected ear  Elevate head on a pillow at night   Use nasal spray directed for nasal congestion  Tylenol or Motrin every 4 - 6 hours as needed for fever or ear pain.  Follow up with your PCP in 1 week of initiating antibiotics or sooner for no improvement in symptoms  Follow up in the ER for any worsening of symptoms such as new fever, increasing ear pain, neck stiffness, shortness of breath, etc.  If you smoke, stop smoking.     General Discharge Instructions   If you were prescribed a narcotic or controlled medication, do not drive or operate heavy equipment or machinery while taking these medications.  If you were prescribed antibiotics, please take them to completion.  You must understand that you've received an Urgent Care treatment only and that you may be released before all your medical problems are known or treated. You, the patient, will arrange for follow up care as instructed.  Follow up with your PCP or specialty clinic as directed in the next 1-2 weeks if not improved or as needed.  You can call [phone] to schedule an appointment with the appropriate provider.  If your condition worsens we recommend that you receive another evaluation at the emergency room immediately or contact your primary medical clinics after hours call service to discuss your concerns.  Please return here or go to the Emergency Department for any concerns or worsening of condition.      WE CANNOT RULE OUT ALL POSSIBLE CAUSES OF YOUR SYMPTOMS IN THE URGENT CARE SETTING PLEASE GO TO THE ER IF YOU FEELS YOUR CONDITION IS WORSENING OR YOU WOULD LIKE EMERGENT EVALUATION.

## 2024-12-23 NOTE — PROGRESS NOTES
"Subjective:      Patient ID: Susana Presley is a 44 y.o. female.    Vitals:  height is 5' 3" (1.6 m) and weight is 81.6 kg (180 lb). Her oral temperature is 99.3 °F (37.4 °C). Her blood pressure is 154/97 (abnormal) and her pulse is 124 (abnormal). Her respiration is 18 and oxygen saturation is 97%.     Chief Complaint: Otalgia    Otalgia   There is pain in both ears. The current episode started in the past 7 days (4 days ago). Associated symptoms include coughing, headaches and a sore throat. She has tried ear drops (robitussin) for the symptoms.       HENT:  Positive for ear pain and sore throat.    Respiratory:  Positive for cough.    Neurological:  Positive for headaches.      Objective:     Physical Exam   Constitutional:  Non-toxic appearance. She does not appear ill.   HENT:   Head: Normocephalic and atraumatic.   Ears:   Right Ear: External ear and ear canal normal. Tympanic membrane is injected and bulging.   Left Ear: Tympanic membrane, external ear and ear canal normal.   Nose: Nose normal.   Mouth/Throat: Mucous membranes are moist. Oropharynx is clear.   Eyes: Extraocular movement intact   Pulmonary/Chest: Effort normal. No respiratory distress.   Abdominal: Normal appearance.   Neurological: no focal deficit. She is alert.   Nursing note and vitals reviewed.    Results for orders placed or performed in visit on 12/23/24   POCT Influenza A/B Rapid Antigen    Collection Time: 12/23/24  8:21 AM   Result Value Ref Range    Rapid Influenza A Ag Negative Negative    Rapid Influenza B Ag Negative Negative     Acceptable Yes    POCT rapid strep A    Collection Time: 12/23/24  8:21 AM   Result Value Ref Range    Rapid Strep A Screen Negative Negative     Acceptable Yes       Assessment:     1. Right otitis media, unspecified otitis media type    2. Sore throat        Plan:       Right otitis media, unspecified otitis media type  -     amoxicillin-clavulanate 875-125mg (AUGMENTIN) " 875-125 mg per tablet; Take 1 tablet by mouth every 12 (twelve) hours. for 7 days  Dispense: 14 tablet; Refill: 0    Sore throat  -     POCT Influenza A/B Rapid Antigen  -     POCT rapid strep A                Patient Instructions   EAR INFECTION  Take full course of antibiotics as prescribed.  Warm compresses to affected ear  Elevate head on a pillow at night   Use nasal spray directed for nasal congestion  Tylenol or Motrin every 4 - 6 hours as needed for fever or ear pain.  Follow up with your PCP in 1 week of initiating antibiotics or sooner for no improvement in symptoms  Follow up in the ER for any worsening of symptoms such as new fever, increasing ear pain, neck stiffness, shortness of breath, etc.  If you smoke, stop smoking.     General Discharge Instructions   If you were prescribed a narcotic or controlled medication, do not drive or operate heavy equipment or machinery while taking these medications.  If you were prescribed antibiotics, please take them to completion.  You must understand that you've received an Urgent Care treatment only and that you may be released before all your medical problems are known or treated. You, the patient, will arrange for follow up care as instructed.  Follow up with your PCP or specialty clinic as directed in the next 1-2 weeks if not improved or as needed.  You can call [phone] to schedule an appointment with the appropriate provider.  If your condition worsens we recommend that you receive another evaluation at the emergency room immediately or contact your primary medical clinics after hours call service to discuss your concerns.  Please return here or go to the Emergency Department for any concerns or worsening of condition.      WE CANNOT RULE OUT ALL POSSIBLE CAUSES OF YOUR SYMPTOMS IN THE URGENT CARE SETTING PLEASE GO TO THE ER IF YOU FEELS YOUR CONDITION IS WORSENING OR YOU WOULD LIKE EMERGENT EVALUATION.